# Patient Record
Sex: FEMALE | Race: WHITE | NOT HISPANIC OR LATINO | Employment: FULL TIME | URBAN - METROPOLITAN AREA
[De-identification: names, ages, dates, MRNs, and addresses within clinical notes are randomized per-mention and may not be internally consistent; named-entity substitution may affect disease eponyms.]

---

## 2023-05-24 LAB
RBC, URINE: 1 /HPF (ref 0–5)
WBC, URINE: <1 /HPF (ref 0–5)

## 2023-05-26 LAB — URINE CULTURE: NORMAL

## 2023-06-21 LAB
ERYTHROCYTE DISTRIBUTION WIDTH (RATIO) BY AUTOMATED COUNT: 13.2 % (ref 11.5–14.5)
ERYTHROCYTE MEAN CORPUSCULAR HEMOGLOBIN CONCENTRATION (G/DL) BY AUTOMATED: 32.1 G/DL (ref 32–36)
ERYTHROCYTE MEAN CORPUSCULAR VOLUME (FL) BY AUTOMATED COUNT: 95 FL (ref 80–100)
ERYTHROCYTES (10*6/UL) IN BLOOD BY AUTOMATED COUNT: 4.37 X10E12/L (ref 4–5.2)
HEMATOCRIT (%) IN BLOOD BY AUTOMATED COUNT: 41.4 % (ref 36–46)
HEMOGLOBIN (G/DL) IN BLOOD: 13.3 G/DL (ref 12–16)
LEUKOCYTES (10*3/UL) IN BLOOD BY AUTOMATED COUNT: 6.9 X10E9/L (ref 4.4–11.3)
NRBC (PER 100 WBCS) BY AUTOMATED COUNT: 0 /100 WBC (ref 0–0)
PLATELETS (10*3/UL) IN BLOOD AUTOMATED COUNT: 276 X10E9/L (ref 150–450)

## 2023-06-27 ENCOUNTER — HOSPITAL ENCOUNTER (OUTPATIENT)
Dept: DATA CONVERSION | Facility: HOSPITAL | Age: 68
End: 2023-06-27
Attending: OBSTETRICS & GYNECOLOGY | Admitting: OBSTETRICS & GYNECOLOGY

## 2023-06-27 DIAGNOSIS — N95.0 POSTMENOPAUSAL BLEEDING: ICD-10-CM

## 2023-06-27 DIAGNOSIS — I10 ESSENTIAL (PRIMARY) HYPERTENSION: ICD-10-CM

## 2023-06-27 DIAGNOSIS — N84.0 POLYP OF CORPUS UTERI: ICD-10-CM

## 2023-07-06 LAB
COMPLETE PATHOLOGY REPORT: NORMAL
CONVERTED CLINICAL DIAGNOSIS-HISTORY: NORMAL
CONVERTED FINAL DIAGNOSIS: NORMAL
CONVERTED FINAL REPORT PDF LINK TO COPY AND PASTE: NORMAL
CONVERTED GROSS DESCRIPTION: NORMAL

## 2023-09-29 VITALS — BODY MASS INDEX: 21.79 KG/M2 | WEIGHT: 111.55 LBS

## 2023-09-30 NOTE — H&P
History & Physical Reviewed:   I have reviewed the History and Physical dated:  21-Jun-2023   History and Physical reviewed and relevant findings noted. Patient examined to review pertinent physical  findings.: No significant changes   Home Medications Reviewed: no changes noted   Allergies Reviewed: no changes noted       ERAS (Enhanced Recovery After Surgery):  ·  ERAS Patient: no     Consent:   COVID-19 Consent:  ·  COVID-19 Risk Consent Surgeon has reviewed key risks related to the risk of lianne COVID-19 and if they contract COVID-19 what the risks are.       Electronic Signatures:  Lorie Coffey)  (Signed 27-Jun-2023 09:15)   Authored: History & Physical Reviewed, ERAS, Consent,  Note Completion      Last Updated: 27-Jun-2023 09:15 by Lorie Coffey)

## 2023-10-02 NOTE — OP NOTE
Post Operative Note:     PreOp Diagnosis: PMB   Post-Procedure Diagnosis: endometrial polyp   Procedure: 1.  hysteroscopy   2.  D&C  3.  polypectomy   4.   5.   Surgeon: Dr. Coffey   Resident/Fellow/Other Assistant: n/a   Anesthesia: LMA   Estimated Blood Loss (mL): none   Specimen: yes. EMC, endometrial polyp   Findings: normal size uterine cavity with small endometrial  polyp and small fibroid     Operative Report Dictated:  Dictation: not applicable - note contains Operative  Report   Operative Report:    Patient was taken to the operating room where general anesthesia was found to be adequate.  She was prepped and draped in the usual sterile fashion in dorsal lithotomy  position.  Pelvic exam under anesthesia found normal retroverted uterus.  The patient's bladder was drained via straight cath with findings of clear yellow urine.  A speculum was placed in the vagina and the anterior lip of the cervix was grasped with  a single tooth tenaculum.  The cervix was easily dilated and the uterus sounded to 7 cm.  The Aveta hysteroscope was advanced using normal saline as an insufflator.  The uterine cavity was a normal size.  A broad endometrial polyp was noted along the  right side of the uterus.  A small < 1 cm suspected fibroid was noted at the fundus with scarring from the anterior to posterior wall.  Both tubal ostia were directly visualized.  The resection device was advanced and the polyp and fibroid were both resected  in full.  The hysteroscope was then removed and a sharp curettage was performed until a gritty texture was noted.  Endometrial curettings  and polyp/fibroid specimen were sent to pathology.  All instruments were then removed from the vagina and good hemostasis  was noted.  The patient tolerated the procedure well and was taken to recovery in stable condition.     Attestation:   Note Completion:  Attending Attestation I performed the procedure without a resident         Electronic  Signatures:  Lorie Coffey)  (Signed 27-Jun-2023 11:34)   Authored: Post Operative Note, Note Completion      Last Updated: 27-Jun-2023 11:34 by Lorie Coffey)

## 2023-12-13 ENCOUNTER — TELEPHONE (OUTPATIENT)
Dept: OBSTETRICS AND GYNECOLOGY | Facility: CLINIC | Age: 68
End: 2023-12-13

## 2024-01-04 ENCOUNTER — ANCILLARY PROCEDURE (OUTPATIENT)
Dept: RADIOLOGY | Facility: CLINIC | Age: 69
End: 2024-01-04
Payer: MEDICARE

## 2024-01-04 VITALS — BODY MASS INDEX: 21.6 KG/M2 | WEIGHT: 110.01 LBS | HEIGHT: 60 IN

## 2024-01-04 DIAGNOSIS — Z12.31 ENCOUNTER FOR SCREENING MAMMOGRAM FOR MALIGNANT NEOPLASM OF BREAST: ICD-10-CM

## 2024-01-04 PROCEDURE — 77067 SCR MAMMO BI INCL CAD: CPT | Performed by: STUDENT IN AN ORGANIZED HEALTH CARE EDUCATION/TRAINING PROGRAM

## 2024-01-04 PROCEDURE — 77067 SCR MAMMO BI INCL CAD: CPT

## 2024-01-04 PROCEDURE — 77063 BREAST TOMOSYNTHESIS BI: CPT | Performed by: STUDENT IN AN ORGANIZED HEALTH CARE EDUCATION/TRAINING PROGRAM

## 2024-02-26 ENCOUNTER — APPOINTMENT (OUTPATIENT)
Dept: RADIOLOGY | Facility: HOSPITAL | Age: 69
DRG: 329 | End: 2024-02-26
Payer: MEDICARE

## 2024-02-26 ENCOUNTER — ANESTHESIA EVENT (OUTPATIENT)
Dept: OPERATING ROOM | Facility: HOSPITAL | Age: 69
DRG: 329 | End: 2024-02-26
Payer: MEDICARE

## 2024-02-26 ENCOUNTER — ANESTHESIA (OUTPATIENT)
Dept: OPERATING ROOM | Facility: HOSPITAL | Age: 69
DRG: 329 | End: 2024-02-26
Payer: MEDICARE

## 2024-02-26 ENCOUNTER — HOSPITAL ENCOUNTER (INPATIENT)
Facility: HOSPITAL | Age: 69
LOS: 4 days | Discharge: HOME | DRG: 329 | End: 2024-03-01
Attending: EMERGENCY MEDICINE | Admitting: COLON & RECTAL SURGERY
Payer: MEDICARE

## 2024-02-26 DIAGNOSIS — Z90.49 S/P SMALL BOWEL RESECTION: ICD-10-CM

## 2024-02-26 DIAGNOSIS — K40.30 INGUINAL HERNIA OF LEFT SIDE WITH OBSTRUCTION: ICD-10-CM

## 2024-02-26 DIAGNOSIS — K66.8 FREE INTRAPERITONEAL AIR: ICD-10-CM

## 2024-02-26 DIAGNOSIS — K46.0 INCARCERATED HERNIA: Primary | ICD-10-CM

## 2024-02-26 PROBLEM — R11.2 PONV (POSTOPERATIVE NAUSEA AND VOMITING): Status: ACTIVE | Noted: 2024-02-26

## 2024-02-26 PROBLEM — Z98.890 PONV (POSTOPERATIVE NAUSEA AND VOMITING): Status: ACTIVE | Noted: 2024-02-26

## 2024-02-26 LAB
ABO GROUP (TYPE) IN BLOOD: NORMAL
ALBUMIN SERPL BCP-MCNC: 4.1 G/DL (ref 3.4–5)
ALP SERPL-CCNC: 37 U/L (ref 33–136)
ALT SERPL W P-5'-P-CCNC: 16 U/L (ref 7–45)
ANION GAP SERPL CALC-SCNC: 14 MMOL/L (ref 10–20)
ANTIBODY SCREEN: NORMAL
APTT PPP: 27 SECONDS (ref 27–38)
AST SERPL W P-5'-P-CCNC: 20 U/L (ref 9–39)
BASOPHILS # BLD AUTO: 0.04 X10*3/UL (ref 0–0.1)
BASOPHILS NFR BLD AUTO: 0.4 %
BILIRUB SERPL-MCNC: 1 MG/DL (ref 0–1.2)
BUN SERPL-MCNC: 12 MG/DL (ref 6–23)
CALCIUM SERPL-MCNC: 9 MG/DL (ref 8.6–10.3)
CHLORIDE SERPL-SCNC: 99 MMOL/L (ref 98–107)
CO2 SERPL-SCNC: 28 MMOL/L (ref 21–32)
CREAT SERPL-MCNC: 0.54 MG/DL (ref 0.5–1.05)
EGFRCR SERPLBLD CKD-EPI 2021: >90 ML/MIN/1.73M*2
EOSINOPHIL # BLD AUTO: 0.02 X10*3/UL (ref 0–0.7)
EOSINOPHIL NFR BLD AUTO: 0.2 %
ERYTHROCYTE [DISTWIDTH] IN BLOOD BY AUTOMATED COUNT: 13.1 % (ref 11.5–14.5)
GLUCOSE SERPL-MCNC: 130 MG/DL (ref 74–99)
HCT VFR BLD AUTO: 42.4 % (ref 36–46)
HGB BLD-MCNC: 13.8 G/DL (ref 12–16)
HOLD SPECIMEN: NORMAL
IMM GRANULOCYTES # BLD AUTO: 0.03 X10*3/UL (ref 0–0.7)
IMM GRANULOCYTES NFR BLD AUTO: 0.3 % (ref 0–0.9)
INR PPP: 1.1 (ref 0.9–1.1)
LYMPHOCYTES # BLD AUTO: 0.75 X10*3/UL (ref 1.2–4.8)
LYMPHOCYTES NFR BLD AUTO: 8.3 %
MCH RBC QN AUTO: 30.1 PG (ref 26–34)
MCHC RBC AUTO-ENTMCNC: 32.5 G/DL (ref 32–36)
MCV RBC AUTO: 93 FL (ref 80–100)
MONOCYTES # BLD AUTO: 0.53 X10*3/UL (ref 0.1–1)
MONOCYTES NFR BLD AUTO: 5.9 %
NEUTROPHILS # BLD AUTO: 7.66 X10*3/UL (ref 1.2–7.7)
NEUTROPHILS NFR BLD AUTO: 84.9 %
NRBC BLD-RTO: 0 /100 WBCS (ref 0–0)
PLATELET # BLD AUTO: 306 X10*3/UL (ref 150–450)
POTASSIUM SERPL-SCNC: 4 MMOL/L (ref 3.5–5.3)
PROT SERPL-MCNC: 6.7 G/DL (ref 6.4–8.2)
PROTHROMBIN TIME: 12.1 SECONDS (ref 9.8–12.8)
RBC # BLD AUTO: 4.58 X10*6/UL (ref 4–5.2)
RH FACTOR (ANTIGEN D): NORMAL
SODIUM SERPL-SCNC: 137 MMOL/L (ref 136–145)
WBC # BLD AUTO: 9 X10*3/UL (ref 4.4–11.3)

## 2024-02-26 PROCEDURE — 86900 BLOOD TYPING SEROLOGIC ABO: CPT | Mod: 91 | Performed by: COLON & RECTAL SURGERY

## 2024-02-26 PROCEDURE — 36415 COLL VENOUS BLD VENIPUNCTURE: CPT | Performed by: EMERGENCY MEDICINE

## 2024-02-26 PROCEDURE — 96375 TX/PRO/DX INJ NEW DRUG ADDON: CPT | Mod: 59

## 2024-02-26 PROCEDURE — 2550000001 HC RX 255 CONTRASTS: Performed by: EMERGENCY MEDICINE

## 2024-02-26 PROCEDURE — 3700000002 HC GENERAL ANESTHESIA TIME - EACH INCREMENTAL 1 MINUTE: Performed by: COLON & RECTAL SURGERY

## 2024-02-26 PROCEDURE — 7100000001 HC RECOVERY ROOM TIME - INITIAL BASE CHARGE: Performed by: COLON & RECTAL SURGERY

## 2024-02-26 PROCEDURE — 7100000002 HC RECOVERY ROOM TIME - EACH INCREMENTAL 1 MINUTE: Performed by: COLON & RECTAL SURGERY

## 2024-02-26 PROCEDURE — 99152 MOD SED SAME PHYS/QHP 5/>YRS: CPT | Performed by: EMERGENCY MEDICINE

## 2024-02-26 PROCEDURE — 2500000004 HC RX 250 GENERAL PHARMACY W/ HCPCS (ALT 636 FOR OP/ED): Performed by: ANESTHESIOLOGIST ASSISTANT

## 2024-02-26 PROCEDURE — 2500000005 HC RX 250 GENERAL PHARMACY W/O HCPCS: Performed by: COLON & RECTAL SURGERY

## 2024-02-26 PROCEDURE — A44202 PR LAP,SURG,ENTERECTOMY,RESECT AND ANAST: Performed by: ANESTHESIOLOGIST ASSISTANT

## 2024-02-26 PROCEDURE — 71045 X-RAY EXAM CHEST 1 VIEW: CPT | Performed by: STUDENT IN AN ORGANIZED HEALTH CARE EDUCATION/TRAINING PROGRAM

## 2024-02-26 PROCEDURE — 96376 TX/PRO/DX INJ SAME DRUG ADON: CPT | Mod: 59

## 2024-02-26 PROCEDURE — 99140 ANES COMP EMERGENCY COND: CPT | Performed by: ANESTHESIOLOGY

## 2024-02-26 PROCEDURE — 2500000005 HC RX 250 GENERAL PHARMACY W/O HCPCS: Performed by: EMERGENCY MEDICINE

## 2024-02-26 PROCEDURE — 44202 LAP ENTERECTOMY: CPT | Performed by: COLON & RECTAL SURGERY

## 2024-02-26 PROCEDURE — 96374 THER/PROPH/DIAG INJ IV PUSH: CPT | Mod: 59

## 2024-02-26 PROCEDURE — 71045 X-RAY EXAM CHEST 1 VIEW: CPT

## 2024-02-26 PROCEDURE — 85610 PROTHROMBIN TIME: CPT | Performed by: EMERGENCY MEDICINE

## 2024-02-26 PROCEDURE — 96361 HYDRATE IV INFUSION ADD-ON: CPT

## 2024-02-26 PROCEDURE — 3600000004 HC OR TIME - INITIAL BASE CHARGE - PROCEDURE LEVEL FOUR: Performed by: COLON & RECTAL SURGERY

## 2024-02-26 PROCEDURE — 80053 COMPREHEN METABOLIC PANEL: CPT | Performed by: EMERGENCY MEDICINE

## 2024-02-26 PROCEDURE — 2500000004 HC RX 250 GENERAL PHARMACY W/ HCPCS (ALT 636 FOR OP/ED): Performed by: EMERGENCY MEDICINE

## 2024-02-26 PROCEDURE — 88307 TISSUE EXAM BY PATHOLOGIST: CPT | Performed by: STUDENT IN AN ORGANIZED HEALTH CARE EDUCATION/TRAINING PROGRAM

## 2024-02-26 PROCEDURE — 74177 CT ABD & PELVIS W/CONTRAST: CPT

## 2024-02-26 PROCEDURE — 2500000005 HC RX 250 GENERAL PHARMACY W/O HCPCS: Performed by: ANESTHESIOLOGIST ASSISTANT

## 2024-02-26 PROCEDURE — 3600000009 HC OR TIME - EACH INCREMENTAL 1 MINUTE - PROCEDURE LEVEL FOUR: Performed by: COLON & RECTAL SURGERY

## 2024-02-26 PROCEDURE — 0YQ64ZZ REPAIR LEFT INGUINAL REGION, PERCUTANEOUS ENDOSCOPIC APPROACH: ICD-10-PCS | Performed by: COLON & RECTAL SURGERY

## 2024-02-26 PROCEDURE — 85025 COMPLETE CBC W/AUTO DIFF WBC: CPT | Performed by: EMERGENCY MEDICINE

## 2024-02-26 PROCEDURE — 99222 1ST HOSP IP/OBS MODERATE 55: CPT | Performed by: COLON & RECTAL SURGERY

## 2024-02-26 PROCEDURE — 0DB84ZZ EXCISION OF SMALL INTESTINE, PERCUTANEOUS ENDOSCOPIC APPROACH: ICD-10-PCS | Performed by: COLON & RECTAL SURGERY

## 2024-02-26 PROCEDURE — 85730 THROMBOPLASTIN TIME PARTIAL: CPT | Performed by: EMERGENCY MEDICINE

## 2024-02-26 PROCEDURE — 88307 TISSUE EXAM BY PATHOLOGIST: CPT | Mod: TC,AHULAB | Performed by: COLON & RECTAL SURGERY

## 2024-02-26 PROCEDURE — 74177 CT ABD & PELVIS W/CONTRAST: CPT | Performed by: RADIOLOGY

## 2024-02-26 PROCEDURE — 2500000004 HC RX 250 GENERAL PHARMACY W/ HCPCS (ALT 636 FOR OP/ED): Performed by: COLON & RECTAL SURGERY

## 2024-02-26 PROCEDURE — 99285 EMERGENCY DEPT VISIT HI MDM: CPT | Mod: 25

## 2024-02-26 PROCEDURE — 3700000001 HC GENERAL ANESTHESIA TIME - INITIAL BASE CHARGE: Performed by: COLON & RECTAL SURGERY

## 2024-02-26 PROCEDURE — 2720000007 HC OR 272 NO HCPCS: Performed by: COLON & RECTAL SURGERY

## 2024-02-26 PROCEDURE — A44202 PR LAP,SURG,ENTERECTOMY,RESECT AND ANAST: Performed by: ANESTHESIOLOGY

## 2024-02-26 PROCEDURE — 1100000001 HC PRIVATE ROOM DAILY

## 2024-02-26 RX ORDER — OXYCODONE HYDROCHLORIDE 5 MG/1
5 TABLET ORAL EVERY 4 HOURS PRN
Status: DISCONTINUED | OUTPATIENT
Start: 2024-02-26 | End: 2024-02-26 | Stop reason: HOSPADM

## 2024-02-26 RX ORDER — FENTANYL CITRATE 50 UG/ML
INJECTION, SOLUTION INTRAMUSCULAR; INTRAVENOUS AS NEEDED
Status: DISCONTINUED | OUTPATIENT
Start: 2024-02-26 | End: 2024-02-26

## 2024-02-26 RX ORDER — SODIUM CHLORIDE, SODIUM LACTATE, POTASSIUM CHLORIDE, CALCIUM CHLORIDE 600; 310; 30; 20 MG/100ML; MG/100ML; MG/100ML; MG/100ML
100 INJECTION, SOLUTION INTRAVENOUS CONTINUOUS
Status: DISCONTINUED | OUTPATIENT
Start: 2024-02-26 | End: 2024-02-26 | Stop reason: HOSPADM

## 2024-02-26 RX ORDER — SODIUM CHLORIDE, SODIUM LACTATE, POTASSIUM CHLORIDE, CALCIUM CHLORIDE 600; 310; 30; 20 MG/100ML; MG/100ML; MG/100ML; MG/100ML
INJECTION, SOLUTION INTRAVENOUS CONTINUOUS PRN
Status: DISCONTINUED | OUTPATIENT
Start: 2024-02-26 | End: 2024-02-26

## 2024-02-26 RX ORDER — MIDAZOLAM HYDROCHLORIDE 1 MG/ML
INJECTION, SOLUTION INTRAMUSCULAR; INTRAVENOUS AS NEEDED
Status: DISCONTINUED | OUTPATIENT
Start: 2024-02-26 | End: 2024-02-26

## 2024-02-26 RX ORDER — DEXAMETHASONE SODIUM PHOSPHATE 4 MG/ML
INJECTION, SOLUTION INTRA-ARTICULAR; INTRALESIONAL; INTRAMUSCULAR; INTRAVENOUS; SOFT TISSUE AS NEEDED
Status: DISCONTINUED | OUTPATIENT
Start: 2024-02-26 | End: 2024-02-26

## 2024-02-26 RX ORDER — PROPOFOL 10 MG/ML
INJECTION, EMULSION INTRAVENOUS CONTINUOUS PRN
Status: DISCONTINUED | OUTPATIENT
Start: 2024-02-26 | End: 2024-02-26

## 2024-02-26 RX ORDER — PROPOFOL 10 MG/ML
INJECTION, EMULSION INTRAVENOUS CODE/TRAUMA/SEDATION MEDICATION
Status: COMPLETED | OUTPATIENT
Start: 2024-02-26 | End: 2024-02-26

## 2024-02-26 RX ORDER — ONDANSETRON HYDROCHLORIDE 2 MG/ML
INJECTION, SOLUTION INTRAVENOUS AS NEEDED
Status: DISCONTINUED | OUTPATIENT
Start: 2024-02-26 | End: 2024-02-26

## 2024-02-26 RX ORDER — PROPOFOL 10 MG/ML
1 INJECTION, EMULSION INTRAVENOUS ONCE
Status: DISCONTINUED | OUTPATIENT
Start: 2024-02-26 | End: 2024-02-27

## 2024-02-26 RX ORDER — ALBUTEROL SULFATE 0.83 MG/ML
2.5 SOLUTION RESPIRATORY (INHALATION) ONCE AS NEEDED
Status: DISCONTINUED | OUTPATIENT
Start: 2024-02-26 | End: 2024-02-26 | Stop reason: HOSPADM

## 2024-02-26 RX ORDER — MORPHINE SULFATE 4 MG/ML
4 INJECTION, SOLUTION INTRAMUSCULAR; INTRAVENOUS ONCE
Status: COMPLETED | OUTPATIENT
Start: 2024-02-26 | End: 2024-02-26

## 2024-02-26 RX ORDER — PROPOFOL 10 MG/ML
0.5 INJECTION, EMULSION INTRAVENOUS AS NEEDED
Status: DISCONTINUED | OUTPATIENT
Start: 2024-02-26 | End: 2024-02-27

## 2024-02-26 RX ORDER — PHENYLEPHRINE HCL IN 0.9% NACL 1 MG/10 ML
SYRINGE (ML) INTRAVENOUS AS NEEDED
Status: DISCONTINUED | OUTPATIENT
Start: 2024-02-26 | End: 2024-02-26

## 2024-02-26 RX ORDER — LIDOCAINE HYDROCHLORIDE 20 MG/ML
INJECTION, SOLUTION INFILTRATION; PERINEURAL AS NEEDED
Status: DISCONTINUED | OUTPATIENT
Start: 2024-02-26 | End: 2024-02-26

## 2024-02-26 RX ORDER — ONDANSETRON HYDROCHLORIDE 2 MG/ML
4 INJECTION, SOLUTION INTRAVENOUS ONCE
Status: COMPLETED | OUTPATIENT
Start: 2024-02-26 | End: 2024-02-26

## 2024-02-26 RX ORDER — PROPOFOL 10 MG/ML
INJECTION, EMULSION INTRAVENOUS AS NEEDED
Status: DISCONTINUED | OUTPATIENT
Start: 2024-02-26 | End: 2024-02-26

## 2024-02-26 RX ORDER — HEPARIN SODIUM 5000 [USP'U]/ML
INJECTION, SOLUTION INTRAVENOUS; SUBCUTANEOUS AS NEEDED
Status: DISCONTINUED | OUTPATIENT
Start: 2024-02-26 | End: 2024-02-26

## 2024-02-26 RX ORDER — ONDANSETRON HYDROCHLORIDE 2 MG/ML
4 INJECTION, SOLUTION INTRAVENOUS ONCE AS NEEDED
Status: DISCONTINUED | OUTPATIENT
Start: 2024-02-26 | End: 2024-02-26 | Stop reason: HOSPADM

## 2024-02-26 RX ORDER — ACETAMINOPHEN 325 MG/1
650 TABLET ORAL EVERY 4 HOURS PRN
Status: DISCONTINUED | OUTPATIENT
Start: 2024-02-26 | End: 2024-02-26 | Stop reason: HOSPADM

## 2024-02-26 RX ADMIN — HYDROMORPHONE HYDROCHLORIDE 0.5 MG: 1 INJECTION, SOLUTION INTRAMUSCULAR; INTRAVENOUS; SUBCUTANEOUS at 23:41

## 2024-02-26 RX ADMIN — SODIUM CHLORIDE, POTASSIUM CHLORIDE, SODIUM LACTATE AND CALCIUM CHLORIDE: 600; 310; 30; 20 INJECTION, SOLUTION INTRAVENOUS at 22:12

## 2024-02-26 RX ADMIN — Medication 200 MCG: at 22:44

## 2024-02-26 RX ADMIN — PROPOFOL 30 MG: 10 INJECTION, EMULSION INTRAVENOUS at 23:24

## 2024-02-26 RX ADMIN — LIDOCAINE HYDROCHLORIDE 60 MG: 20 INJECTION, SOLUTION INFILTRATION; PERINEURAL at 22:20

## 2024-02-26 RX ADMIN — ONDANSETRON 4 MG: 2 INJECTION INTRAMUSCULAR; INTRAVENOUS at 23:22

## 2024-02-26 RX ADMIN — IOHEXOL 75 ML: 350 INJECTION, SOLUTION INTRAVENOUS at 19:47

## 2024-02-26 RX ADMIN — PROPOFOL 20 MG: 10 INJECTION, EMULSION INTRAVENOUS at 18:59

## 2024-02-26 RX ADMIN — HEPARIN SODIUM 5000 UNITS: 5000 INJECTION INTRAVENOUS; SUBCUTANEOUS at 22:18

## 2024-02-26 RX ADMIN — Medication: at 17:45

## 2024-02-26 RX ADMIN — PROPOFOL 10 MG: 10 INJECTION, EMULSION INTRAVENOUS at 19:03

## 2024-02-26 RX ADMIN — SODIUM CHLORIDE, SODIUM LACTATE, POTASSIUM CHLORIDE, CALCIUM CHLORIDE: 600; 310; 30; 20 INJECTION, SOLUTION INTRAVENOUS at 22:24

## 2024-02-26 RX ADMIN — MIDAZOLAM HYDROCHLORIDE 2 MG: 1 INJECTION, SOLUTION INTRAMUSCULAR; INTRAVENOUS at 22:12

## 2024-02-26 RX ADMIN — PROPOFOL 200 MCG/KG/MIN: 10 INJECTION, EMULSION INTRAVENOUS at 22:21

## 2024-02-26 RX ADMIN — MORPHINE SULFATE 4 MG: 4 INJECTION, SOLUTION INTRAMUSCULAR; INTRAVENOUS at 21:01

## 2024-02-26 RX ADMIN — PROPOFOL 70 MG: 10 INJECTION, EMULSION INTRAVENOUS at 23:12

## 2024-02-26 RX ADMIN — PROPOFOL 20 MG: 10 INJECTION, EMULSION INTRAVENOUS at 18:58

## 2024-02-26 RX ADMIN — PROPOFOL 130 MG: 10 INJECTION, EMULSION INTRAVENOUS at 22:20

## 2024-02-26 RX ADMIN — PIPERACILLIN SODIUM AND TAZOBACTAM SODIUM 4.5 G: 4; .5 INJECTION, SOLUTION INTRAVENOUS at 21:06

## 2024-02-26 RX ADMIN — PROPOFOL 20 MG: 10 INJECTION, EMULSION INTRAVENOUS at 19:01

## 2024-02-26 RX ADMIN — HYDROMORPHONE HYDROCHLORIDE 0.5 MG: 1 INJECTION, SOLUTION INTRAMUSCULAR; INTRAVENOUS; SUBCUTANEOUS at 23:54

## 2024-02-26 RX ADMIN — SODIUM CHLORIDE 1000 ML: 9 INJECTION, SOLUTION INTRAVENOUS at 18:01

## 2024-02-26 RX ADMIN — FENTANYL CITRATE 50 MCG: 50 INJECTION, SOLUTION INTRAMUSCULAR; INTRAVENOUS at 23:34

## 2024-02-26 RX ADMIN — Medication 200 MCG: at 22:32

## 2024-02-26 RX ADMIN — Medication 0.25 MCG/KG/MIN: at 22:21

## 2024-02-26 RX ADMIN — ONDANSETRON 4 MG: 2 INJECTION INTRAMUSCULAR; INTRAVENOUS at 19:24

## 2024-02-26 RX ADMIN — PROPOFOL 20 MG: 10 INJECTION, EMULSION INTRAVENOUS at 19:05

## 2024-02-26 RX ADMIN — DEXAMETHASONE SODIUM PHOSPHATE 4 MG: 4 INJECTION, SOLUTION INTRAMUSCULAR; INTRAVENOUS at 23:22

## 2024-02-26 RX ADMIN — MORPHINE SULFATE 4 MG: 4 INJECTION, SOLUTION INTRAMUSCULAR; INTRAVENOUS at 17:10

## 2024-02-26 RX ADMIN — PROPOFOL 10 MG: 10 INJECTION, EMULSION INTRAVENOUS at 19:00

## 2024-02-26 RX ADMIN — PROPOFOL 10 MG: 10 INJECTION, EMULSION INTRAVENOUS at 19:01

## 2024-02-26 RX ADMIN — MORPHINE SULFATE 4 MG: 4 INJECTION, SOLUTION INTRAMUSCULAR; INTRAVENOUS at 19:24

## 2024-02-26 RX ADMIN — FENTANYL CITRATE 50 MCG: 50 INJECTION, SOLUTION INTRAMUSCULAR; INTRAVENOUS at 22:20

## 2024-02-26 RX ADMIN — PROPOFOL 20 MG: 10 INJECTION, EMULSION INTRAVENOUS at 19:04

## 2024-02-26 ASSESSMENT — PAIN - FUNCTIONAL ASSESSMENT
PAIN_FUNCTIONAL_ASSESSMENT: 0-10

## 2024-02-26 ASSESSMENT — PAIN SCALES - GENERAL
PAINLEVEL_OUTOF10: 5 - MODERATE PAIN
PAINLEVEL_OUTOF10: 8
PAINLEVEL_OUTOF10: 8
PAINLEVEL_OUTOF10: 9
PAINLEVEL_OUTOF10: 10 - WORST POSSIBLE PAIN
PAINLEVEL_OUTOF10: 8

## 2024-02-26 ASSESSMENT — PAIN DESCRIPTION - LOCATION: LOCATION: GROIN

## 2024-02-26 ASSESSMENT — COLUMBIA-SUICIDE SEVERITY RATING SCALE - C-SSRS
2. HAVE YOU ACTUALLY HAD ANY THOUGHTS OF KILLING YOURSELF?: NO
1. IN THE PAST MONTH, HAVE YOU WISHED YOU WERE DEAD OR WISHED YOU COULD GO TO SLEEP AND NOT WAKE UP?: NO
6. HAVE YOU EVER DONE ANYTHING, STARTED TO DO ANYTHING, OR PREPARED TO DO ANYTHING TO END YOUR LIFE?: NO

## 2024-02-26 ASSESSMENT — PAIN DESCRIPTION - ORIENTATION: ORIENTATION: LEFT

## 2024-02-26 NOTE — DISCHARGE INSTRUCTIONS
You have been sent home a prescription for Iron based on your lab work after surgery would take for the next month. You have also been sent with a prescription for Ibuprofen and Oxycodone. Please purchase Miralax or Colace over the counter and take 1-2 times per day. You may also take Tylenol as needed for pain.     Instructions After Laparoscopic Surgery  Wound Care:  You have a portion of your incision that is now open to improve the redness around your incision and prevent infection   - please keep wound clean and dry throughout the day  - please shower daily, remove guaze packing prior to or during shower If the packing becomes dry and painful to remove  - let the water hit the inside of the incision, you do not need to intentionally place soap in the opening of the wound  - when you get off the shower please pat dry around the outside and replace guaze, okay to cover with abdominal bandage, you do not need tape, you may use underwear to hold into place  - DO NOT SOAK with the open incision   - The hole with gradually get smaller, okay to use less guaze as this happens  -do not apply topical creams or ointments  -call if you notice redness around wound, foul-smelliing drainage, or increasing pain   - plan is to see Dr. Berman in the office in 7-10 days after discharge from the hospital  Diet:          -Avoid greasy, fatty foods first few weeks          -[place], soft meals first day or two after surgery  Activity          -Take it easy for the first 48 hours          -stairs and walks are fine          -resume activities gradually over the first week          -ask Dr Berman before resuming strenuous physical exertions          -No driving while on pain medication  Medications          -Pain medicine prescription attached for severe pain          -You can also take Motrin/Ibuprofen 600mg every 6 hours for mild pain          -Resume your home medications unless otherwise directed          -To avoid constipation please  take Colace 100mg twice a day (over the counter)  Other Instructions          -Call to make appointment within 1-2 days: [phone]          -Call the doctor for the following:   severe unrelieved pain   fevers > 101F   Nausea/vomiting   wound issues   insurance/return to work forms   shortness of breath   chest pains

## 2024-02-26 NOTE — ED PROVIDER NOTES
HPI   Chief Complaint   Patient presents with    Hernia       Chief complaint: Hernia    History of present illness: Patient is a 68-year-old female with history of a hernia of her left lower quadrant presenting to the emergency department with complaints of pain.  According to the patient, she has a history of a hernia in her left groin.  Patient states that starting today, she began to experience pain in the area where the hernia is located this radiates throughout her abdomen.  Patient denies ever having pain like this in the past she admits to nausea with this without vomiting.  Patient denies any fever with this.  Concern the pain is intolerable, the patient presents to the emergency department for further evaluation.  Patient has no other complaints at this time.        History provided by:  Patient   used: No                        Nayeli Coma Scale Score: 15                     Patient History   History reviewed. No pertinent past medical history.  History reviewed. No pertinent surgical history.  No family history on file.  Social History     Tobacco Use    Smoking status: Not on file    Smokeless tobacco: Not on file   Substance Use Topics    Alcohol use: Not on file    Drug use: Not on file       Physical Exam   ED Triage Vitals   Temperature Heart Rate Respirations BP   02/26/24 1309 02/26/24 1309 02/26/24 1309 02/26/24 1310   36.7 °C (98.1 °F) 73 16 106/56      Pulse Ox Temp Source Heart Rate Source Patient Position   02/26/24 1309 02/26/24 1309 -- --   98 % Tympanic        BP Location FiO2 (%)     -- --             Physical Exam  Constitutional:       Appearance: Normal appearance.   HENT:      Head: Normocephalic and atraumatic.      Right Ear: External ear normal.      Left Ear: External ear normal.      Nose: Nose normal.      Mouth/Throat:      Mouth: Mucous membranes are moist.   Eyes:      General: Lids are normal.      Extraocular Movements: Extraocular movements intact.       Pupils: Pupils are equal, round, and reactive to light.   Cardiovascular:      Rate and Rhythm: Normal rate and regular rhythm.      Heart sounds: Normal heart sounds.   Pulmonary:      Effort: Pulmonary effort is normal.      Breath sounds: Normal breath sounds.   Abdominal:      General: Abdomen is flat.      Palpations: Abdomen is soft.      Tenderness: There is no abdominal tenderness. There is no guarding or rebound.      Hernia: A hernia is present. Hernia is present in the left inguinal area.   Musculoskeletal:         General: No deformity. Normal range of motion.      Cervical back: Normal range of motion and neck supple.   Skin:     General: Skin is warm.      Capillary Refill: Capillary refill takes less than 2 seconds.      Coloration: Skin is not jaundiced.   Neurological:      General: No focal deficit present.      Mental Status: She is alert and oriented to person, place, and time.   Psychiatric:         Mood and Affect: Mood normal.         Behavior: Behavior normal.         ED Course & MDM   Diagnoses as of 02/27/24 1742   Incarcerated hernia       Medical Decision Making  Medical decision making: Patient remained stable throughout my time with her in the emergency department.  Initially on presentation to the emergency department was apparent that the patient was experiencing a incarcerated hernia of the left lower quadrant.  The patient was placed in Trendelenburg and an ice pack was placed over the hernia.  The patient was given 4 mg of morphine IV after this, I attempted reduction of the hernia with gentle gentle steady pressure however this was unsuccessful.  The patient had too much pain and the hernia was not decreasing in size significantly as a result, I explained the patient would have to go undergo procedural sedation for reduction of this hernia.  The patient expressed understanding.    The patient was placed on monitor.  The patient was given propofol IV until she was sedated.  Once  proper analgesia was obtained, I placed heavy pressure with my hands over the hernia finally, the hernia reduced in size and reduced.  I stayed with the patient until she recovered from the propofol bolus.    To my surprise, the patient was continued to have abdominal discomfort after the reduction and had some rebound tenderness as a result, I decided I would image the patient.  CAT scan of the patient's abdomen and pelvis with IV contrast demonstrated what is likely necrotic bowel with a small amount of free air in the patient's peritoneal cavity.  The patient was ordered Zosyn.  I explained to the patient that she had a perforated bowel and would require surgical consultation.    I contacted the surgeon on-call regarding this patient's case they expressed understanding the patient was evaluated and taken directly to the operating room for definitive management of this radiographic finding.    Amount and/or Complexity of Data Reviewed  Labs: ordered. Decision-making details documented in ED Course.  Radiology: ordered. Decision-making details documented in ED Course.  ECG/medicine tests: ordered and independent interpretation performed. Decision-making details documented in ED Course.        Procedure  Moderate Sedation    Performed by: Toro Bolanos MD  Authorized by: Toro Bolanos MD    Consent:     Consent obtained:  Verbal    Consent given by:  Patient    Risks, benefits, and alternatives were discussed: yes      Risks discussed:  Respiratory compromise necessitating ventilatory assistance and intubation and inadequate sedation    Alternatives discussed:  Analgesia without sedation  Universal protocol:     Procedure explained and questions answered to patient or proxy's satisfaction: yes      Test results available: yes      Required blood products, implants, devices, and special equipment available: yes      Immediately prior to procedure, a time out was called: yes      Patient identity confirmed:   Verbally with patient  Indications:     Sedation is required to allow for: Left inguinal hernia reduction.    Procedure necessitating sedation performed by:  Physician performing sedation    Intended level of sedation:  Moderate  Pre-sedation assessment:     Time since last food or drink:  08:00    ASA classification: class 1 - normal, healthy patient      Neck mobility: normal      Pre-sedation assessments completed and reviewed: airway patency, anesthesia/sedation history, mental status, pain level and respiratory function      History of difficult intubation: no    Immediate pre-procedure details:     Reassessment: Patient reassessed immediately prior to procedure      Reviewed: vital signs, relevant labs/tests and NPO status      Verified: bag valve mask available, emergency equipment available, IV patency confirmed and oxygen available    Procedure details (see MAR for exact dosages):     Preoxygenation:  Nasal cannula    Sedation:  Propofol    Intra-procedure monitoring:  Blood pressure monitoring, cardiac monitor, continuous pulse oximetry, continuous capnometry, frequent LOC assessments and frequent vital sign checks    Intra-procedure events: none      Total sedation time (minutes):  10  Post-procedure details:     Attendance: Constant attendance by certified staff until patient recovered      Recovery: Patient returned to pre-procedure baseline      Estimated blood loss (see I/O flowsheets): no      Post-sedation assessments completed and reviewed: airway patency, cardiovascular function, mental status, nausea/vomiting, pain level and respiratory function      Specimens recovered:  None    Patient is stable for discharge or admission: yes      Procedure completion:  Tolerated       Toro Bolanos MD  02/27/24 0913

## 2024-02-26 NOTE — ED TRIAGE NOTES
PT PRESENTS TO THE ED FOR LEFT SIDE GROIN HERNIA. PT STATES THAT SHE FELT A POP TODAY AND NOTICED IT WAS STICKING OUT. PT ENDORSES THAT SHE WAS TOLD T COME TO THE ED FROM PCP IF THIS WAS TO HAPPEN. PT ENDORSES NAUSEA WITH THIS. PT STATES SHE WAS NOT LIFTING ANYTHING WHEN THIS HAPPENED.

## 2024-02-27 LAB
ANION GAP SERPL CALC-SCNC: 11 MMOL/L (ref 10–20)
BUN SERPL-MCNC: 13 MG/DL (ref 6–23)
CALCIUM SERPL-MCNC: 8.3 MG/DL (ref 8.6–10.3)
CHLORIDE SERPL-SCNC: 103 MMOL/L (ref 98–107)
CO2 SERPL-SCNC: 26 MMOL/L (ref 21–32)
CREAT SERPL-MCNC: 0.54 MG/DL (ref 0.5–1.05)
EGFRCR SERPLBLD CKD-EPI 2021: >90 ML/MIN/1.73M*2
ERYTHROCYTE [DISTWIDTH] IN BLOOD BY AUTOMATED COUNT: 13.2 % (ref 11.5–14.5)
GLUCOSE SERPL-MCNC: 138 MG/DL (ref 74–99)
HCT VFR BLD AUTO: 31 % (ref 36–46)
HGB BLD-MCNC: 10.8 G/DL (ref 12–16)
MCH RBC QN AUTO: 30.7 PG (ref 26–34)
MCHC RBC AUTO-ENTMCNC: 34.8 G/DL (ref 32–36)
MCV RBC AUTO: 88 FL (ref 80–100)
NRBC BLD-RTO: 0 /100 WBCS (ref 0–0)
PLATELET # BLD AUTO: 229 X10*3/UL (ref 150–450)
POTASSIUM SERPL-SCNC: 3.9 MMOL/L (ref 3.5–5.3)
RBC # BLD AUTO: 3.52 X10*6/UL (ref 4–5.2)
SODIUM SERPL-SCNC: 136 MMOL/L (ref 136–145)
WBC # BLD AUTO: 13.4 X10*3/UL (ref 4.4–11.3)

## 2024-02-27 PROCEDURE — 36415 COLL VENOUS BLD VENIPUNCTURE: CPT | Performed by: COLON & RECTAL SURGERY

## 2024-02-27 PROCEDURE — 2500000001 HC RX 250 WO HCPCS SELF ADMINISTERED DRUGS (ALT 637 FOR MEDICARE OP): Performed by: COLON & RECTAL SURGERY

## 2024-02-27 PROCEDURE — 9420000001 HC RT PATIENT EDUCATION 5 MIN

## 2024-02-27 PROCEDURE — 80048 BASIC METABOLIC PNL TOTAL CA: CPT | Performed by: COLON & RECTAL SURGERY

## 2024-02-27 PROCEDURE — 85027 COMPLETE CBC AUTOMATED: CPT | Performed by: COLON & RECTAL SURGERY

## 2024-02-27 PROCEDURE — 1100000001 HC PRIVATE ROOM DAILY

## 2024-02-27 PROCEDURE — 99232 SBSQ HOSP IP/OBS MODERATE 35: CPT

## 2024-02-27 PROCEDURE — 2500000004 HC RX 250 GENERAL PHARMACY W/ HCPCS (ALT 636 FOR OP/ED): Performed by: ANESTHESIOLOGY

## 2024-02-27 PROCEDURE — 2500000001 HC RX 250 WO HCPCS SELF ADMINISTERED DRUGS (ALT 637 FOR MEDICARE OP): Performed by: ANESTHESIOLOGY

## 2024-02-27 PROCEDURE — 2500000004 HC RX 250 GENERAL PHARMACY W/ HCPCS (ALT 636 FOR OP/ED): Performed by: COLON & RECTAL SURGERY

## 2024-02-27 RX ORDER — ESTRADIOL 0.03 MG/D
1 PATCH TRANSDERMAL
Status: CANCELLED | OUTPATIENT
Start: 2024-02-28

## 2024-02-27 RX ORDER — SODIUM CHLORIDE 9 MG/ML
50 INJECTION, SOLUTION INTRAVENOUS CONTINUOUS
Status: DISCONTINUED | OUTPATIENT
Start: 2024-02-27 | End: 2024-02-29

## 2024-02-27 RX ORDER — ESTRADIOL 0.03 MG/D
2 PATCH, EXTENDED RELEASE TRANSDERMAL 2 TIMES WEEKLY
COMMUNITY
End: 2024-04-03 | Stop reason: SDUPTHER

## 2024-02-27 RX ORDER — OXYCODONE HYDROCHLORIDE 5 MG/1
5 TABLET ORAL EVERY 4 HOURS PRN
Status: DISCONTINUED | OUTPATIENT
Start: 2024-02-27 | End: 2024-03-01 | Stop reason: HOSPADM

## 2024-02-27 RX ORDER — ALBUTEROL SULFATE 0.83 MG/ML
2.5 SOLUTION RESPIRATORY (INHALATION) ONCE AS NEEDED
Status: DISCONTINUED | OUTPATIENT
Start: 2024-02-27 | End: 2024-02-27 | Stop reason: HOSPADM

## 2024-02-27 RX ORDER — AMLODIPINE AND BENAZEPRIL HYDROCHLORIDE 10; 20 MG/1; MG/1
1 CAPSULE ORAL
Status: CANCELLED | OUTPATIENT
Start: 2024-02-27

## 2024-02-27 RX ORDER — OXYCODONE HYDROCHLORIDE 5 MG/1
5 TABLET ORAL EVERY 4 HOURS PRN
Status: DISCONTINUED | OUTPATIENT
Start: 2024-02-27 | End: 2024-02-27 | Stop reason: HOSPADM

## 2024-02-27 RX ORDER — OXYCODONE HYDROCHLORIDE 5 MG/1
10 TABLET ORAL EVERY 4 HOURS PRN
Status: DISCONTINUED | OUTPATIENT
Start: 2024-02-27 | End: 2024-03-01 | Stop reason: HOSPADM

## 2024-02-27 RX ORDER — NALOXONE HYDROCHLORIDE 0.4 MG/ML
0.2 INJECTION, SOLUTION INTRAMUSCULAR; INTRAVENOUS; SUBCUTANEOUS EVERY 5 MIN PRN
Status: DISCONTINUED | OUTPATIENT
Start: 2024-02-27 | End: 2024-03-01 | Stop reason: HOSPADM

## 2024-02-27 RX ORDER — ONDANSETRON HYDROCHLORIDE 2 MG/ML
4 INJECTION, SOLUTION INTRAVENOUS EVERY 8 HOURS PRN
Status: DISCONTINUED | OUTPATIENT
Start: 2024-02-27 | End: 2024-03-01 | Stop reason: HOSPADM

## 2024-02-27 RX ORDER — SODIUM CHLORIDE, SODIUM LACTATE, POTASSIUM CHLORIDE, CALCIUM CHLORIDE 600; 310; 30; 20 MG/100ML; MG/100ML; MG/100ML; MG/100ML
100 INJECTION, SOLUTION INTRAVENOUS CONTINUOUS
Status: DISCONTINUED | OUTPATIENT
Start: 2024-02-27 | End: 2024-02-27 | Stop reason: HOSPADM

## 2024-02-27 RX ORDER — ONDANSETRON HYDROCHLORIDE 2 MG/ML
4 INJECTION, SOLUTION INTRAVENOUS ONCE AS NEEDED
Status: DISCONTINUED | OUTPATIENT
Start: 2024-02-27 | End: 2024-02-27 | Stop reason: HOSPADM

## 2024-02-27 RX ORDER — ENOXAPARIN SODIUM 100 MG/ML
40 INJECTION SUBCUTANEOUS EVERY 24 HOURS
Status: DISCONTINUED | OUTPATIENT
Start: 2024-02-27 | End: 2024-03-01 | Stop reason: HOSPADM

## 2024-02-27 RX ORDER — ACETAMINOPHEN 325 MG/1
650 TABLET ORAL EVERY 6 HOURS
Status: DISCONTINUED | OUTPATIENT
Start: 2024-02-27 | End: 2024-03-01 | Stop reason: HOSPADM

## 2024-02-27 RX ORDER — PROGESTERONE 100 MG/1
100 CAPSULE ORAL DAILY
Status: CANCELLED | OUTPATIENT
Start: 2024-02-27

## 2024-02-27 RX ORDER — AMLODIPINE AND BENAZEPRIL HYDROCHLORIDE 5; 10 MG/1; MG/1
1 CAPSULE ORAL
COMMUNITY
Start: 2022-08-11

## 2024-02-27 RX ORDER — ONDANSETRON 4 MG/1
4 TABLET, ORALLY DISINTEGRATING ORAL EVERY 8 HOURS PRN
Status: DISCONTINUED | OUTPATIENT
Start: 2024-02-27 | End: 2024-03-01 | Stop reason: HOSPADM

## 2024-02-27 RX ORDER — ACETAMINOPHEN 325 MG/1
650 TABLET ORAL EVERY 4 HOURS PRN
Status: DISCONTINUED | OUTPATIENT
Start: 2024-02-27 | End: 2024-02-27 | Stop reason: HOSPADM

## 2024-02-27 RX ORDER — KETOROLAC TROMETHAMINE 30 MG/ML
15 INJECTION, SOLUTION INTRAMUSCULAR; INTRAVENOUS EVERY 6 HOURS SCHEDULED
Status: DISPENSED | OUTPATIENT
Start: 2024-02-27 | End: 2024-02-29

## 2024-02-27 RX ORDER — PROGESTERONE 100 MG/1
100 CAPSULE ORAL DAILY
COMMUNITY

## 2024-02-27 RX ORDER — GABAPENTIN 300 MG/1
300 CAPSULE ORAL 3 TIMES DAILY
Status: DISCONTINUED | OUTPATIENT
Start: 2024-02-27 | End: 2024-03-01

## 2024-02-27 RX ORDER — AMLODIPINE BESYLATE 5 MG/1
5 TABLET ORAL DAILY
Status: ON HOLD | COMMUNITY
End: 2024-02-27 | Stop reason: WASHOUT

## 2024-02-27 RX ADMIN — HYDROMORPHONE HYDROCHLORIDE 0.5 MG: 1 INJECTION, SOLUTION INTRAMUSCULAR; INTRAVENOUS; SUBCUTANEOUS at 00:00

## 2024-02-27 RX ADMIN — PIPERACILLIN SODIUM AND TAZOBACTAM SODIUM 3.38 G: 3; .375 INJECTION, SOLUTION INTRAVENOUS at 09:18

## 2024-02-27 RX ADMIN — KETOROLAC TROMETHAMINE 15 MG: 30 INJECTION, SOLUTION INTRAMUSCULAR; INTRAVENOUS at 09:44

## 2024-02-27 RX ADMIN — PIPERACILLIN SODIUM AND TAZOBACTAM SODIUM 3.38 G: 3; .375 INJECTION, SOLUTION INTRAVENOUS at 02:13

## 2024-02-27 RX ADMIN — OXYCODONE HYDROCHLORIDE 5 MG: 5 TABLET ORAL at 00:18

## 2024-02-27 RX ADMIN — HYDROMORPHONE HYDROCHLORIDE 0.5 MG: 1 INJECTION, SOLUTION INTRAMUSCULAR; INTRAVENOUS; SUBCUTANEOUS at 00:07

## 2024-02-27 RX ADMIN — ACETAMINOPHEN 650 MG: 325 TABLET ORAL at 14:29

## 2024-02-27 RX ADMIN — KETOROLAC TROMETHAMINE 15 MG: 30 INJECTION, SOLUTION INTRAMUSCULAR; INTRAVENOUS at 02:14

## 2024-02-27 RX ADMIN — ENOXAPARIN SODIUM 40 MG: 40 INJECTION SUBCUTANEOUS at 02:14

## 2024-02-27 RX ADMIN — GABAPENTIN 300 MG: 300 CAPSULE ORAL at 14:29

## 2024-02-27 RX ADMIN — SODIUM CHLORIDE 100 ML/HR: 9 INJECTION, SOLUTION INTRAVENOUS at 02:14

## 2024-02-27 RX ADMIN — GABAPENTIN 300 MG: 300 CAPSULE ORAL at 21:18

## 2024-02-27 RX ADMIN — ACETAMINOPHEN 650 MG: 325 TABLET ORAL at 21:18

## 2024-02-27 RX ADMIN — GABAPENTIN 300 MG: 300 CAPSULE ORAL at 09:18

## 2024-02-27 RX ADMIN — PIPERACILLIN SODIUM AND TAZOBACTAM SODIUM 3.38 G: 3; .375 INJECTION, SOLUTION INTRAVENOUS at 21:18

## 2024-02-27 RX ADMIN — ACETAMINOPHEN 650 MG: 325 TABLET ORAL at 08:00

## 2024-02-27 RX ADMIN — PIPERACILLIN SODIUM AND TAZOBACTAM SODIUM 3.38 G: 3; .375 INJECTION, SOLUTION INTRAVENOUS at 14:29

## 2024-02-27 SDOH — SOCIAL STABILITY: SOCIAL INSECURITY: ARE THERE ANY APPARENT SIGNS OF INJURIES/BEHAVIORS THAT COULD BE RELATED TO ABUSE/NEGLECT?: NO

## 2024-02-27 SDOH — SOCIAL STABILITY: SOCIAL INSECURITY: DOES ANYONE TRY TO KEEP YOU FROM HAVING/CONTACTING OTHER FRIENDS OR DOING THINGS OUTSIDE YOUR HOME?: NO

## 2024-02-27 SDOH — SOCIAL STABILITY: SOCIAL INSECURITY: DO YOU FEEL UNSAFE GOING BACK TO THE PLACE WHERE YOU ARE LIVING?: NO

## 2024-02-27 SDOH — SOCIAL STABILITY: SOCIAL INSECURITY: WERE YOU ABLE TO COMPLETE ALL THE BEHAVIORAL HEALTH SCREENINGS?: YES

## 2024-02-27 SDOH — SOCIAL STABILITY: SOCIAL INSECURITY: ARE YOU OR HAVE YOU BEEN THREATENED OR ABUSED PHYSICALLY, EMOTIONALLY, OR SEXUALLY BY ANYONE?: NO

## 2024-02-27 SDOH — SOCIAL STABILITY: SOCIAL INSECURITY: HAVE YOU HAD THOUGHTS OF HARMING ANYONE ELSE?: NO

## 2024-02-27 SDOH — SOCIAL STABILITY: SOCIAL INSECURITY: ABUSE: ADULT

## 2024-02-27 SDOH — SOCIAL STABILITY: SOCIAL INSECURITY: HAS ANYONE EVER THREATENED TO HURT YOUR FAMILY OR YOUR PETS?: NO

## 2024-02-27 SDOH — SOCIAL STABILITY: SOCIAL INSECURITY: DO YOU FEEL ANYONE HAS EXPLOITED OR TAKEN ADVANTAGE OF YOU FINANCIALLY OR OF YOUR PERSONAL PROPERTY?: NO

## 2024-02-27 ASSESSMENT — COGNITIVE AND FUNCTIONAL STATUS - GENERAL
CLIMB 3 TO 5 STEPS WITH RAILING: A LITTLE
DAILY ACTIVITIY SCORE: 24
MOBILITY SCORE: 20
WALKING IN HOSPITAL ROOM: A LITTLE
WALKING IN HOSPITAL ROOM: A LITTLE
MOVING TO AND FROM BED TO CHAIR: A LITTLE
DAILY ACTIVITIY SCORE: 24
MOVING TO AND FROM BED TO CHAIR: A LITTLE
PATIENT BASELINE BEDBOUND: NO
STANDING UP FROM CHAIR USING ARMS: A LITTLE
STANDING UP FROM CHAIR USING ARMS: A LITTLE
DAILY ACTIVITIY SCORE: 24
WALKING IN HOSPITAL ROOM: A LITTLE
CLIMB 3 TO 5 STEPS WITH RAILING: A LITTLE
STANDING UP FROM CHAIR USING ARMS: A LITTLE
MOBILITY SCORE: 20
MOBILITY SCORE: 20
CLIMB 3 TO 5 STEPS WITH RAILING: A LITTLE
MOVING TO AND FROM BED TO CHAIR: A LITTLE

## 2024-02-27 ASSESSMENT — ACTIVITIES OF DAILY LIVING (ADL)
TOILETING: INDEPENDENT
HEARING - RIGHT EAR: FUNCTIONAL
LACK_OF_TRANSPORTATION: NO
DRESSING YOURSELF: INDEPENDENT
LACK_OF_TRANSPORTATION: NO
HEARING - LEFT EAR: FUNCTIONAL
JUDGMENT_ADEQUATE_SAFELY_COMPLETE_DAILY_ACTIVITIES: YES
ADEQUATE_TO_COMPLETE_ADL: YES
FEEDING YOURSELF: INDEPENDENT
GROOMING: INDEPENDENT
WALKS IN HOME: NEEDS ASSISTANCE
BATHING: INDEPENDENT
PATIENT'S MEMORY ADEQUATE TO SAFELY COMPLETE DAILY ACTIVITIES?: YES

## 2024-02-27 ASSESSMENT — PAIN SCALES - GENERAL
PAINLEVEL_OUTOF10: 6
PAINLEVEL_OUTOF10: 8
PAINLEVEL_OUTOF10: 2
PAINLEVEL_OUTOF10: 6
PAINLEVEL_OUTOF10: 5 - MODERATE PAIN
PAINLEVEL_OUTOF10: 7
PAINLEVEL_OUTOF10: 8
PAINLEVEL_OUTOF10: 3

## 2024-02-27 ASSESSMENT — PATIENT HEALTH QUESTIONNAIRE - PHQ9
1. LITTLE INTEREST OR PLEASURE IN DOING THINGS: NOT AT ALL
2. FEELING DOWN, DEPRESSED OR HOPELESS: NOT AT ALL
SUM OF ALL RESPONSES TO PHQ9 QUESTIONS 1 & 2: 0

## 2024-02-27 ASSESSMENT — LIFESTYLE VARIABLES
HAVE YOU OR SOMEONE ELSE BEEN INJURED AS A RESULT OF YOUR DRINKING: NO
HOW OFTEN DO YOU HAVE 6 OR MORE DRINKS ON ONE OCCASION: LESS THAN MONTHLY
SKIP TO QUESTIONS 9-10: 0
AUDIT-C TOTAL SCORE: 4
AUDIT TOTAL SCORE: 4
AUDIT-C TOTAL SCORE: 4
HOW OFTEN DURING THE LAST YEAR HAVE YOU HAD A FEELING OF GUILT OR REMORSE AFTER DRINKING: NEVER
HOW MANY STANDARD DRINKS CONTAINING ALCOHOL DO YOU HAVE ON A TYPICAL DAY: 1 OR 2
HOW OFTEN DURING THE LAST YEAR HAVE YOU BEEN UNABLE TO REMEMBER WHAT HAPPENED THE NIGHT BEFORE BECAUSE YOU HAD BEEN DRINKING: NEVER
HAS A RELATIVE, FRIEND, DOCTOR, OR ANOTHER HEALTH PROFESSIONAL EXPRESSED CONCERN ABOUT YOUR DRINKING OR SUGGESTED YOU CUT DOWN: NO
HOW OFTEN DURING THE LAST YEAR HAVE YOU NEEDED AN ALCOHOLIC DRINK FIRST THING IN THE MORNING TO GET YOURSELF GOING AFTER A NIGHT OF HEAVY DRINKING: NEVER
HOW OFTEN DO YOU HAVE A DRINK CONTAINING ALCOHOL: 2-3 TIMES A WEEK
HOW OFTEN DURING THE LAST YEAR HAVE YOU FOUND THAT YOU WERE NOT ABLE TO STOP DRINKING ONCE YOU HAD STARTED: NEVER
HOW OFTEN DURING THE LAST YEAR HAVE YOU FAILED TO DO WHAT WAS NORMALLY EXPECTED FROM YOU BECAUSE OF DRINKING: NEVER
AUDIT TOTAL SCORE: 0

## 2024-02-27 ASSESSMENT — PAIN - FUNCTIONAL ASSESSMENT
PAIN_FUNCTIONAL_ASSESSMENT: 0-10

## 2024-02-27 ASSESSMENT — PAIN DESCRIPTION - ORIENTATION: ORIENTATION: LEFT

## 2024-02-27 ASSESSMENT — PAIN DESCRIPTION - LOCATION
LOCATION: ABDOMEN

## 2024-02-27 NOTE — CARE PLAN
The patient's goals for the shift include keep pt free from injury    The clinical goals for the shift include pain management

## 2024-02-27 NOTE — PROGRESS NOTES
"Ludmila Escamilla \"Lubna\" is a 68 y.o. female on day 1 of admission presenting with Incarcerated hernia.     02/27/24 1006   Discharge Planning   Living Arrangements Spouse/significant other   Support Systems Spouse/significant other   Assistance Needed independent, uses cane for long distances   Type of Residence Private residence   Home or Post Acute Services None   Does the patient need discharge transport arranged? Yes   RoundTrip coordination needed? Yes   Has discharge transport been arranged? No   Financial Resource Strain   How hard is it for you to pay for the very basics like food, housing, medical care, and heating? Not hard   Housing Stability   In the last 12 months, was there a time when you were not able to pay the mortgage or rent on time? N   In the last 12 months, was there a time when you did not have a steady place to sleep or slept in a shelter (including now)? N   Transportation Needs   In the past 12 months, has lack of transportation kept you from medical appointments or from getting medications? no   In the past 12 months, has lack of transportation kept you from meetings, work, or from getting things needed for daily living? No     Spoke with patient to discuss her preferences for care. Discussed how patient manages health at home. Liveshome with spouse. Independent in all ADLS.  No home O2, dialysis, or assistive devices. Patient denies any problems getting to appointments or obtaining/affording medications.  No additional resources or needs identified.    Plan: admitted s/p removal strangulated hernia. States pain is controlled at the moment. Will need to advance diet to see if patient is able to tolerate.   Status: inpatient  Payor: [employer] medicare  Disposition: Home  Barrier: pain control, tolerate diet  ADOD:   1-2 days      Kate Moseley RN      "

## 2024-02-27 NOTE — ANESTHESIA PROCEDURE NOTES
Airway  Date/Time: 2/26/2024 10:22 PM  Urgency: elective    Airway not difficult    Staffing  Performed: AJ   Authorized by: Arden Liriano MD    Performed by: AJ Humphries  Patient location during procedure: OR    Indications and Patient Condition  Indications for airway management: anesthesia  Spontaneous ventilation: present  Sedation level: moderate (conscious sedation)  Preoxygenated: yes  Patient position: sniffing  Mask difficulty assessment: 0 - not attempted  Planned trial extubation    Final Airway Details  Final airway type: endotracheal airway      Successful airway: ETT  Cuffed: yes   Successful intubation technique: direct laryngoscopy  Facilitating devices/methods: cricoid pressure and intubating stylet  Endotracheal tube insertion site: oral  Blade: Cyn  Blade size: #3  ETT size (mm): 7.0  Cormack-Lehane Classification: grade I - full view of glottis  Placement verified by: capnometry   Measured from: lips  ETT to lips (cm): 20

## 2024-02-27 NOTE — PROGRESS NOTES
Lubna Escamilla is a 68 y.o. female on day 1 of admission presenting with Incarcerated hernia.    Subjective   NIKOLAI. Patient doing well this morning. Has not eaten since surgery. -flatus, -BM.     Objective     Physical Exam  Constitutional:       Appearance: Normal appearance.   HENT:      Head: Normocephalic.      Nose: Nose normal.      Mouth/Throat:      Mouth: Mucous membranes are moist.   Eyes:      Extraocular Movements: Extraocular movements intact.      Pupils: Pupils are equal, round, and reactive to light.   Cardiovascular:      Rate and Rhythm: Normal rate and regular rhythm.   Pulmonary:      Effort: Pulmonary effort is normal.      Breath sounds: Normal breath sounds.   Abdominal:      General: Abdomen is flat. Bowel sounds are normal. There is distension (mild, soft).      Palpations: Abdomen is soft.      Tenderness: There is abdominal tenderness (appropiately).      Comments: TAMAR wetzel  NITIN 50cc/24hr- sanguinous   Musculoskeletal:         General: No swelling. Normal range of motion.   Skin:     General: Skin is warm and dry.      Capillary Refill: Capillary refill takes less than 2 seconds.   Neurological:      General: No focal deficit present.      Mental Status: She is alert and oriented to person, place, and time.   Psychiatric:         Mood and Affect: Mood normal.         Behavior: Behavior normal.         Thought Content: Thought content normal.         Judgment: Judgment normal.       Last Recorded Vitals  Blood pressure 126/70, pulse 84, temperature 36.9 °C (98.4 °F), temperature source Temporal, resp. rate 18, height 1.524 m (5'), weight 49.9 kg (110 lb), SpO2 99 %.  Intake/Output last 3 Shifts:  I/O last 3 completed shifts:  In: 700 (14 mL/kg) [I.V.:700 (14 mL/kg)]  Out: 210 (4.2 mL/kg) [Urine:150 (0.1 mL/kg/hr); Drains:50; Blood:10]  Weight: 49.9 kg     Relevant Results  Scheduled medications  acetaminophen, 650 mg, oral, q6h  enoxaparin, 40 mg, subcutaneous, q24h  gabapentin, 300 mg, oral,  TID  [Held by provider] ketorolac, 15 mg, intravenous, q6h GENESIS  piperacillin-tazobactam, 3.375 g, intravenous, q6h      Continuous medications  sodium chloride 0.9%, 100 mL/hr, Last Rate: 100 mL/hr (02/27/24 0214)      PRN medications  PRN medications: HYDROmorphone, naloxone, ondansetron ODT **OR** ondansetron, oxyCODONE, oxyCODONE, oxygen    .  Results for orders placed or performed during the hospital encounter of 02/26/24 (from the past 24 hour(s))   CBC with Differential   Result Value Ref Range    WBC 9.0 4.4 - 11.3 x10*3/uL    nRBC 0.0 0.0 - 0.0 /100 WBCs    RBC 4.58 4.00 - 5.20 x10*6/uL    Hemoglobin 13.8 12.0 - 16.0 g/dL    Hematocrit 42.4 36.0 - 46.0 %    MCV 93 80 - 100 fL    MCH 30.1 26.0 - 34.0 pg    MCHC 32.5 32.0 - 36.0 g/dL    RDW 13.1 11.5 - 14.5 %    Platelets 306 150 - 450 x10*3/uL    Neutrophils % 84.9 40.0 - 80.0 %    Immature Granulocytes %, Automated 0.3 0.0 - 0.9 %    Lymphocytes % 8.3 13.0 - 44.0 %    Monocytes % 5.9 2.0 - 10.0 %    Eosinophils % 0.2 0.0 - 6.0 %    Basophils % 0.4 0.0 - 2.0 %    Neutrophils Absolute 7.66 1.20 - 7.70 x10*3/uL    Immature Granulocytes Absolute, Automated 0.03 0.00 - 0.70 x10*3/uL    Lymphocytes Absolute 0.75 (L) 1.20 - 4.80 x10*3/uL    Monocytes Absolute 0.53 0.10 - 1.00 x10*3/uL    Eosinophils Absolute 0.02 0.00 - 0.70 x10*3/uL    Basophils Absolute 0.04 0.00 - 0.10 x10*3/uL   Comprehensive Metabolic Panel   Result Value Ref Range    Glucose 130 (H) 74 - 99 mg/dL    Sodium 137 136 - 145 mmol/L    Potassium 4.0 3.5 - 5.3 mmol/L    Chloride 99 98 - 107 mmol/L    Bicarbonate 28 21 - 32 mmol/L    Anion Gap 14 10 - 20 mmol/L    Urea Nitrogen 12 6 - 23 mg/dL    Creatinine 0.54 0.50 - 1.05 mg/dL    eGFR >90 >60 mL/min/1.73m*2    Calcium 9.0 8.6 - 10.3 mg/dL    Albumin 4.1 3.4 - 5.0 g/dL    Alkaline Phosphatase 37 33 - 136 U/L    Total Protein 6.7 6.4 - 8.2 g/dL    AST 20 9 - 39 U/L    Bilirubin, Total 1.0 0.0 - 1.2 mg/dL    ALT 16 7 - 45 U/L   Protime-INR   Result  Value Ref Range    Protime 12.1 9.8 - 12.8 seconds    INR 1.1 0.9 - 1.1   aPTT   Result Value Ref Range    aPTT 27 27 - 38 seconds   Lavender Top   Result Value Ref Range    Extra Tube Hold for add-ons.    Type and Screen   Result Value Ref Range    ABO TYPE A     Rh TYPE POS     ANTIBODY SCREEN NEG    Basic Metabolic Panel   Result Value Ref Range    Glucose 138 (H) 74 - 99 mg/dL    Sodium 136 136 - 145 mmol/L    Potassium 3.9 3.5 - 5.3 mmol/L    Chloride 103 98 - 107 mmol/L    Bicarbonate 26 21 - 32 mmol/L    Anion Gap 11 10 - 20 mmol/L    Urea Nitrogen 13 6 - 23 mg/dL    Creatinine 0.54 0.50 - 1.05 mg/dL    eGFR >90 >60 mL/min/1.73m*2    Calcium 8.3 (L) 8.6 - 10.3 mg/dL   CBC   Result Value Ref Range    WBC 13.4 (H) 4.4 - 11.3 x10*3/uL    nRBC 0.0 0.0 - 0.0 /100 WBCs    RBC 3.52 (L) 4.00 - 5.20 x10*6/uL    Hemoglobin 10.8 (L) 12.0 - 16.0 g/dL    Hematocrit 31.0 (L) 36.0 - 46.0 %    MCV 88 80 - 100 fL    MCH 30.7 26.0 - 34.0 pg    MCHC 34.8 32.0 - 36.0 g/dL    RDW 13.2 11.5 - 14.5 %    Platelets 229 150 - 450 x10*3/uL     Assessment/Plan   Principal Problem:    Incarcerated hernia  Active Problems:    PONV (postoperative nausea and vomiting)    Inguinal hernia of left side with obstruction    Free intraperitoneal air    uLbna Escamilla is a 68 y.o. female on day 1 of admission presenting with Incarcerated hernia. She is now POD1 lap SBR. Intra-op findings of bloody fluid in the abdomen 1 5 cm area of ischemia bowel with focal perforation.  The bowel was in the abdominal cavity and did not require reduction.  Fluid was pushed out of the hernia and the peritoneum was closed using 3.0 PDS.    Neuro: post-surgical pain, currently well managed  - OOB/ ambulate 5x per day  - Scheduled acetaminophen  - Toradol held for hgb drop  - PRN oxy and breakthrough dilaudid unless absolutely needed    CV: RRR, BP stable  - VS every 4 hours     Pulm: Patent airways, equal and symmetrical chest expansion, on 2L NC  - IS every hour  while awake   - Pulse ox every 4 hours with VS  - May wean O2    GI: mildly distended, soft, appropriately tender, surgical site c/d/I, NITIN 50cc- sanguinous  - CLD ordered  - OOB/ambulation  - IS encourged  - DVT proph: SCDs/lovenox  - PRN antiemetic     : rothman in place  - Cr 0.54  - Electrolytes wnl  - Monitor I&Os  - Rothman discontinued    HEME: H/H 10.8/31.0 from 13.8/42.4  - DVT Proph  - SCDs/ ambulate/ lovenox  - Toradol discontinued    ID: Afebrile  - WBC 13.4 from 9.0  - Monitor for s/s infection    Dispo:  - Continue care on nursing floor  - Awaiting RBF, diet tolerance    Discussed with Dr Kovacs.    I spent 35 minutes in the professional and overall care of this patient.    Yogi Whitt PANicolásC    Pt feeling much better , no gas or rumbles    Gen well appearing   Abd - softly distended, appr tender, bruising around incision, bloody drain output  Extr - no edema    Impression - POD # 0 s/p lx SBR and repair of hernia    Plan   Continue IV abx and drain  Continue IVF, clears  SCD's/lovenox  IS/OOB  Home meds   Will await some bowel function or decreased bloating before advancing diet  Rothman out   Keep drain for now

## 2024-02-27 NOTE — CARE PLAN
The patient's goals for the shift include keep pt free from injury    The clinical goals for the shift include pain management    Over the shift, the patient did not make progress toward the following goals. Barriers to progression include . Recommendations to address these barriers include .

## 2024-02-27 NOTE — OP NOTE
"Resection Laparoscopy Small Intestine; REMOVAL OF STRANGULATED HERNIA (L) Operative Note     Date: 2024  OR Location: [facility]    Name: Ludmila Escamilla \"Caitlyn", : 1955, Age: 68 y.o., MRN: [id number], Sex: female    Diagnosis  Pre-op Diagnosis     * Inguinal hernia of left side with obstruction [K40.30]     * Free intraperitoneal air [K66.8] Post-op Diagnosis     * Inguinal hernia of left side with obstruction [K40.30]     * Free intraperitoneal air [K66.8]     Procedures  Resection Laparoscopy Small Intestine; REMOVAL OF STRANGULATED HERNIA  63122 - AR LAPS ENTERECT RESCJ 1 SMALL INTEST RESCJ & OTONIEL  herniorraphy    Surgeons      * Argenis Kovacs - Primary    Resident/Fellow/Other Assistant:  Surgeon(s) and Role:    Procedure Summary  Anesthesia: General  ASA: III  Anesthesia Staff: Anesthesiologist: Arden Liriano MD  C-AA: AJ Humphries  Estimated Blood Loss: 10mL  Intra-op Medications:   Administrations occurring from 24 2200 to 24 0015:   Medication Name Total Dose   BUPivacaine HCl (Marcaine) 0.5 % (5 mg/mL) 30 mL in sodium chloride 0.9% 30 mL syringe 50 mL              Anesthesia Record               Intraprocedure I/O Totals          Intake    Remifentanil Drip 0.00 mL    The total shown is the total volume documented since Anesthesia Start was filed.    Propofol Drip 0.00 mL    The total shown is the total volume documented since Anesthesia Start was filed.    Total Intake 0 mL       Output    Est. Blood Loss 10 mL    Total Output 10 mL       Net    Net Volume -10 mL          Specimen:   ID Type Source Tests Collected by Time   1 : SMALL BOWEL RESECTION Tissue SMALL BOWEL /INTESTINE SEGMENTAL RESECTION SURGICAL PATHOLOGY EXAM Argenis Kovacs MD 2024 2305        Staff:   Circulator: Lindsey Brown RN  Scrub Person: Juan Duran         Drains and/or Catheters:   Closed/Suction Drain Anterior Abdomen 19 Fr. (Active)       Urethral Catheter 16 Fr. " (Active)         Findings: bloody fluid in the abdomen 1 5 cm area of ischemia bowel with focal perforation.  The bowel was in the abdominal cavity and did not require reduction.  Fluid was pushed out of the hernia and the peritoneum was closed using 3.0 PDS.      Indications: Lubna Escamilla is an 68 y.o. female who is having surgery for Inguinal hernia of left side with obstruction [K40.30]  Free intraperitoneal air [K66.8].     The patient was seen in the preoperative area. The risks, benefits, complications, treatment options, non-operative alternatives, expected recovery and outcomes were discussed with the patient. The possibilities of reaction to medication, pulmonary aspiration, injury to surrounding structures, bleeding, recurrent infection, the need for additional procedures, failure to diagnose a condition, and creating a complication requiring transfusion or operation were discussed with the patient. The patient concurred with the proposed plan, giving informed consent.  The site of surgery was properly noted/marked if necessary per policy. The patient has been actively warmed in preoperative area. Preoperative antibiotics have been ordered and given within 2 hours of incision. Venous thrombosis prophylaxis have been ordered including bilateral sequential compression devices and chemical prophylaxis    Procedure Details: The patient was brought to the operating room and moved to the table in the supine position, general endotracheal anesthesia was induced IV antibiotics and a heparin shot were administered and she was prepped using ChloraPrep was allowed to dry for 3 minutes.  She was put in the split leg position and taped and draped in the usual sterile fashion.    A 3 cm periumbilical incision was made using the knife the subcutaneous tissues were divided using the knife as was the fascia.  A finger sweep was performed there were no adhesions the GelPort wound protector was placed using 2-10s and 1-12  mm port.  We then inspected the peritoneal cavity.    There was clear bloody serosanguineous drainage in the pelvis this was irrigated out with 1 L of sterile saline and suctioned out.  There was 1 obviously injected and indurated short segment of small bowel that was clearly the area that had been incarcerated.  The hernia left inguinal hernia was identified and was quite small.  We switched out any fluid that was left in the hernia sac and the hernia sac was cinched down using a circular 3-0 absorbable V-Loc suture with overlapping figure-of-eight sutures.  A 5 mm port site was placed in the right lower quadrant to perform that.  A four-quadrant tap block was performed instilling 10 cc of quarter percent Marcaine right upper quadrant right lower quadrant left upper and left lower quadrant under direct vision.  A 19 Tamazight Grant drain was placed and brought out through the 5 mm port site and placed in the pelvis.    The indurated area of small bowel was grasped and brought out through the wound protector with ease.  The there was a clear perforation in this area of small bowel the proximal and distal appeared normal.  2 enterotomies were made in the normal bowel proximal and distal.  A side-to-side ISAURA 75 blue load stapler was performed.  The  remainder of the perforation and the indurated area were resected using reload of the ISAURA 75 green load stapler.  This was oversewn using interrupted figure-of-eight 3-0 Vicryl's to stop the bleeding.  The bowel was then returned to the peritoneal cavity clean closure was performed the fascia was using interrupted figure-of-eight 0 PDS sutures the skin was closed using Monocryl and LiquiBand.  A drain stitch was placed on the drain.   Complications:  None; patient tolerated the procedure well.    Disposition: PACU - hemodynamically stable.  Condition: stable       Attending Attestation: I performed the procedure.    Argenis Kovacs  Phone Number: [phone]

## 2024-02-27 NOTE — H&P
History Of Present Illness  Lubna Escamilla is a 68 y.o. female presenting with abdominal pain that started this AM after water aerobics.  She has a history of L inguinal hernia that has popped in an out over the past 2 years.  She had a normal BM in the AM.  Went to water aerobics.  The hernia popped out and hurt more than usual and she was unable to reduce it.  The pain got worse and worse through the day and she presented to the ED.  Since being here she has a low grade fever and chills.       Past Medical History  Past Medical History:   Diagnosis Date    HTN (hypertension)     Limb girdle muscular dystrophy, unspecified (CMS/HCC)        Surgical History  Past Surgical History:   Procedure Laterality Date    COLONOSCOPY      DILATION AND CURETTAGE OF UTERUS  06/27/2023    HIP RESURFACING Left     LASIK      TOTAL HIP ARTHROPLASTY Right         Social History  She has no history on file for tobacco use, alcohol use, and drug use.    Family History  No family history on file.   Brother DMD  Mom CHF  Dad CAD    Allergies  Patient has no known allergies.    Review of Systems     Physical Exam  Constitutional: Well developed, awake/alert/oriented x3, no distress, alert and cooperative   Eyes: Sclera anicteric, no conjunctival inflammation, conjugate gaze   ENMT: mucous membranes moist, no apparent injury,   Head/Neck: Neck supple, no apparent injury, No JVD, trachea midline, no bruits   Respiratory/Thorax: Patent airways, CTAB, normal breath sounds with good chest expansion, thorax symmetric   Cardiovascular: Regular, rate and rhythm, no murmurs, normal S1 and S2   Gastrointestinal: Nondistended, soft, tender over the anterior n, no organomegaly, +BS, no bruits, left groin swollen  Extremities: normal extremities, no cyanosis edema, contusions or wounds, 2+ femoral pulses B/L   Neurological: alert and oriented x3, normal strength, Normal gait   Lymphatic: No palpable inguinal lymphadenopathy   Psychological:  Appropriate mood and behavior   Skin: Warm and dry, no lesions, no rashes        Last Recorded Vitals  Blood pressure 145/67, pulse 99, temperature 36.7 °C (98.1 °F), temperature source Tympanic, resp. rate 18, height 1.524 m (5'), weight 49.9 kg (110 lb), SpO2 97 %.    === 02/26/24 ===    CT ABDOMEN PELVIS W IV CONTRAST    - Impression -  1.  Evidence of free intraperitoneal air concerning for perforated  viscus. There is a thickened small bowel loops seen in the pelvis  somewhat difficult to evaluate due to bilateral hip arthroplasties.  Inflammation and air seen adjacent to it. There is an inguinal hernia  on the left containing fluid and air. I suspect that this is the  source of perforated viscus and may be related to ischemic changes.  Extensive diverticulosis. Surgical consultation advised.  2. Mild fatty infiltration of the liver.  3. Stable left hyperdense renal cyst      MACRO:  Fredo Wong discussed the significance and urgency of this critical  finding by epic chat with  KATHLEEN COLON on 2/26/2024 at 8:34 pm.  (**-RCF-**) Findings:  See findings.    Signed by: Fredo Wong 2/26/2024 8:34 PM  Dictation workstation:   [id number]       Lab Results   Component Value Date    WBC 9.0 02/26/2024    HGB 13.8 02/26/2024    HCT 42.4 02/26/2024    MCV 93 02/26/2024     02/26/2024     .lastcmp    Assessment/Plan   Principal Problem:    Incarcerated hernia  Active Problems:    Inguinal hernia of left side with obstruction    Free intraperitoneal air    Impression:  Perforated small bowel in incarcerated/strangulated L inguinal hernia    Plan   Zosyn,   IVF  To OR for emergent SBR and primary repair of hernia tonight        I spent 60 minutes in the professional and overall care of this patient.      Argenis Kovacs MD

## 2024-02-27 NOTE — ANESTHESIA PREPROCEDURE EVALUATION
"Patient: Ludmila Escamilla \"Lubna\"    Procedure Information       Date: 02/26/24    Procedure: Resection Laparoscopy Small Intestine (Left)    Location: [facility]    Surgeons: Argenis Kovacs MD            Relevant Problems   Anesthesia   (+) PONV (postoperative nausea and vomiting)      Digestive   (+) Free intraperitoneal air   (+) Incarcerated hernia       Clinical information reviewed:    Allergies  Meds   Med Hx  Surg Hx  OB Status  Fam Hx  Soc Hx        NPO/Void Status  Date of Last Liquid: 02/26/24  Time of Last Liquid: 1100  Date of Last Solid: 02/26/24  Time of Last Solid: 0800  Last Intake Type: Light meal           Past Medical History:   Diagnosis Date    HTN (hypertension)     Limb girdle muscular dystrophy, unspecified (CMS/HCC)       Past Surgical History:   Procedure Laterality Date    COLONOSCOPY      DILATION AND CURETTAGE OF UTERUS  06/27/2023    HIP RESURFACING Bilateral     right 2011, left 2012    LASIK      TOTAL HIP ARTHROPLASTY Bilateral 2013    Conversion from hip resurfacing to total, right 6/2013, left 12/2013         No current outpatient medications   Allergies   Allergen Reactions    Succinylcholine Other     Risk of rhabdomyolysis due to limb-girdle muscular dystrophy        Chemistry    Lab Results   Component Value Date/Time     02/26/2024 1351    K 4.0 02/26/2024 1351    CL 99 02/26/2024 1351    CO2 28 02/26/2024 1351    BUN 12 02/26/2024 1351    CREATININE 0.54 02/26/2024 1351    Lab Results   Component Value Date/Time    CALCIUM 9.0 02/26/2024 1351    ALKPHOS 37 02/26/2024 1351    AST 20 02/26/2024 1351    ALT 16 02/26/2024 1351    BILITOT 1.0 02/26/2024 1351          Lab Results   Component Value Date/Time    WBC 9.0 02/26/2024 1351    HGB 13.8 02/26/2024 1351    HCT 42.4 02/26/2024 1351     02/26/2024 1351     Lab Results   Component Value Date/Time    PROTIME 12.1 02/26/2024 2044    INR 1.1 02/26/2024 2044     No results found for this or any " "previous visit (from the past 4464 hour(s)).  No results found for this or any previous visit from the past 1095 days.   Echo 5/2/2013@[facility]:  LEFT VENTRICLE   The left ventricle is normal in size.   Left ventricular systolic function is normal.   Baseline left ventricular diastolic function is normal.   Mitral annular lateral E/e\": 5.7. Mitral annular septal E/e\": 11.4.     RIGHT VENTRICLE   The right ventricle is normal in size.   Right ventricular systolic function is normal.   Estimated right ventricular systolic pressure is 25 mmHg consistent with normal   pulmonary artery pressures. Estimated right atrial pressure is 0 mmHg.     LEFT ATRIUM   The left atrial cavity is normal in size.   Pulmonary Veins:   The pulmonary venous pattern showed normal systolic flow.     RIGHT ATRIUM   The right atrial cavity is normal in size.   Inferior Vena Cava:   The inferior vena cava appears small measuring 1.1 cm. The vessel decreases   greater than 50 percent with inspiration.     MITRAL VALVE   The mitral valve leaflets are structurally normal. Native. There is no mitral   stenosis. There is trivial mitral valve regurgitation. The pressure half time is   75 msec. The peak mitral E/A ratio is 1.15. The average mitral E/e\" ratio is 8.6.   The mitral flow deceleration time is 257 msec.     TRICUSPID VALVE   The tricuspid valve leaflets are structurally normal. Native. There is no   tricuspid stenosis. There is trivial tricuspid valve regurgitation. The hepatic   venous pattern showed normal systolic flow.     AORTIC VALVE   There is no aortic valve stenosis. There is no aortic valve regurgitation.   Tricuspid aortic valve. There is mild thickening. There is mild calcification.     PULMONIC VALVE   The pulmonic valve cusps are structurally normal. There is no pulmonic stenosis.   There is no pulmonic valve regurgitation.     AORTA   The visualized aorta is normal in size.   Measurements - Mid ascending aorta 2.5 cm. "     PULMONARY ARTERIES   The pulmonary arteries are normal.     INTERATRIAL SEPTUM   The interatrial septum is normal.     INTERVENTRICULAR SEPTUM   The interventricular septum is normal.     PERICARDIUM   The pericardium is normal.     CONCLUSIONS:   - Exam indication: SOB   - The left ventricle is normal in size. Left ventricular systolic function is   normal. EF = 61 ± 5% (2D 4-ch.) Baseline left ventricular diastolic function is   normal.   - The right ventricle is normal in size. Right ventricular systolic function is   normal.   - No prior echocardiographic exam available for comparison.     Visit Vitals  /73   Pulse 101   Temp 37.6 °C (99.7 °F) (Temporal)   Resp 20   Ht 1.524 m (5')   Wt 49.9 kg (110 lb)   SpO2 98%   BMI 21.48 kg/m²   OB Status Postmenopausal   BSA 1.45 m²        Physical Exam    Airway  Mallampati: III  TM distance: >3 FB  Neck ROM: full     Cardiovascular   Rhythm: regular  Rate: normal     Dental - normal exam     Pulmonary   Breath sounds clear to auscultation     Abdominal - normal exam              Anesthesia Plan    History of general anesthesia?: yes  History of complications of general anesthesia?: no    ASA 3 - emergent     general   (General with ETT, TIVA, avoid SUX and inhalational due to Limb-girdle muscular dystrophy)  intravenous induction   Postoperative administration of opioids is intended.  Anesthetic plan and risks discussed with patient.    Plan discussed with CAA and CRNA.

## 2024-02-27 NOTE — CONSULTS
Nutrition Education Note  Nutrition Assessment      Reason for Assessment  Reason for Assessment: Provider consult order Minimal residue/low fiber diet education    Chart reviewed and pt visited.    Lubna is a 68 y.o. female admitted for incarcerated hernia    Discussed minimal residue/low fiber diet with pt. Left pt with handout.    History:  Past Medical History:   Diagnosis Date    HTN (hypertension)     Limb girdle muscular dystrophy, unspecified (CMS/HCC)      Anthropometrics:  Height: 152.4 cm (5')  Weight: 49.9 kg (110 lb)  BMI (Calculated): 21.48    Weight Change: 0    Wt Readings from Last 5 Encounters:   02/27/24 49.9 kg (110 lb)   01/04/24 49.9 kg (110 lb 0.2 oz)   07/19/23 49.9 kg (110 lb)   06/21/23 49.9 kg (110 lb)   05/24/23 49.9 kg (110 lb)     Weight Change  Significant Weight Loss: No     IBW/kg (Dietitian Calculated): 45.5 kg  Percent of IBW: 110 %     Education Documentation  Nutrition Care Manual, taught by Martine Carlin RDN, LD at 2/27/2024 10:40 AM.  Learner: Patient  Readiness: Acceptance  Method: Explanation, Handout  Response: Verbalizes Understanding   Discussed low fiber diet with pt. Discussed foods recommended to consume and food recommended to avoid once diet advances.  Left pt with handout: minimal residue and low fiber     Follow Up  Time Spent (min): 30 minutes  Last Date of Nutrition Visit: 02/27/24  Nutrition Follow-Up Needed?: Dietitian to reassess per policy  Follow up Comment: Low fiber diet education- Wes

## 2024-02-27 NOTE — PROGRESS NOTES
"Pharmacy Medication History Review    Ludmila Escamilla \"Lubna\" is a 68 y.o. female admitted for Incarcerated hernia. Pharmacy reviewed the patient's [id number] medications and allergies for accuracy.    The list below reflectives the updated PTA list. Please review each medication in order reconciliation for additional clarification and justification.  Prior to Admission Medications   Prescriptions Last Dose Informant     Chiqui 0.025 mg/24 hr patch Past Week      Sig: Place 2 patches on the skin 2 times a week.   amLODIPine-benazepriL (Lotrel) 5-10 mg capsule 2/25/2024      Sig: Take 1 capsule by mouth once daily.   progesterone (Prometrium) 100 mg capsule 2/25/2024      Sig: Take 1 capsule (100 mg) by mouth once daily.      Facility-Administered Medications: None      Allergies  Reviewed by Valentine Pederson RN on 2/26/2024        Severity Reactions Comments    Succinylcholine High Other Risk of rhabdomyolysis due to limb-girdle muscular dystrophy            Below are additional concerns with the patient's PTA list.  Patient verified all medications and doses.    Keren Chavira CPhT    "

## 2024-02-28 LAB
ANION GAP SERPL CALC-SCNC: 9 MMOL/L (ref 10–20)
BUN SERPL-MCNC: 11 MG/DL (ref 6–23)
CALCIUM SERPL-MCNC: 8.1 MG/DL (ref 8.6–10.3)
CHLORIDE SERPL-SCNC: 105 MMOL/L (ref 98–107)
CO2 SERPL-SCNC: 28 MMOL/L (ref 21–32)
CREAT SERPL-MCNC: 0.6 MG/DL (ref 0.5–1.05)
EGFRCR SERPLBLD CKD-EPI 2021: >90 ML/MIN/1.73M*2
ERYTHROCYTE [DISTWIDTH] IN BLOOD BY AUTOMATED COUNT: 13.3 % (ref 11.5–14.5)
GLUCOSE SERPL-MCNC: 91 MG/DL (ref 74–99)
HCT VFR BLD AUTO: 26.9 % (ref 36–46)
HGB BLD-MCNC: 8.6 G/DL (ref 12–16)
MCH RBC QN AUTO: 30.3 PG (ref 26–34)
MCHC RBC AUTO-ENTMCNC: 32 G/DL (ref 32–36)
MCV RBC AUTO: 95 FL (ref 80–100)
NRBC BLD-RTO: 0 /100 WBCS (ref 0–0)
PLATELET # BLD AUTO: 211 X10*3/UL (ref 150–450)
POTASSIUM SERPL-SCNC: 3.7 MMOL/L (ref 3.5–5.3)
RBC # BLD AUTO: 2.84 X10*6/UL (ref 4–5.2)
SODIUM SERPL-SCNC: 138 MMOL/L (ref 136–145)
WBC # BLD AUTO: 6.1 X10*3/UL (ref 4.4–11.3)

## 2024-02-28 PROCEDURE — 1100000001 HC PRIVATE ROOM DAILY

## 2024-02-28 PROCEDURE — 36415 COLL VENOUS BLD VENIPUNCTURE: CPT | Performed by: COLON & RECTAL SURGERY

## 2024-02-28 PROCEDURE — 85027 COMPLETE CBC AUTOMATED: CPT | Performed by: COLON & RECTAL SURGERY

## 2024-02-28 PROCEDURE — 2500000004 HC RX 250 GENERAL PHARMACY W/ HCPCS (ALT 636 FOR OP/ED)

## 2024-02-28 PROCEDURE — 80048 BASIC METABOLIC PNL TOTAL CA: CPT | Performed by: COLON & RECTAL SURGERY

## 2024-02-28 PROCEDURE — 2500000004 HC RX 250 GENERAL PHARMACY W/ HCPCS (ALT 636 FOR OP/ED): Performed by: COLON & RECTAL SURGERY

## 2024-02-28 PROCEDURE — 99232 SBSQ HOSP IP/OBS MODERATE 35: CPT

## 2024-02-28 PROCEDURE — 2500000001 HC RX 250 WO HCPCS SELF ADMINISTERED DRUGS (ALT 637 FOR MEDICARE OP): Performed by: COLON & RECTAL SURGERY

## 2024-02-28 RX ADMIN — ACETAMINOPHEN 650 MG: 325 TABLET ORAL at 20:46

## 2024-02-28 RX ADMIN — PIPERACILLIN SODIUM AND TAZOBACTAM SODIUM 3.38 G: 3; .375 INJECTION, SOLUTION INTRAVENOUS at 15:37

## 2024-02-28 RX ADMIN — GABAPENTIN 300 MG: 300 CAPSULE ORAL at 20:48

## 2024-02-28 RX ADMIN — SODIUM CHLORIDE 50 ML/HR: 9 INJECTION, SOLUTION INTRAVENOUS at 14:09

## 2024-02-28 RX ADMIN — SODIUM CHLORIDE 100 ML/HR: 9 INJECTION, SOLUTION INTRAVENOUS at 02:49

## 2024-02-28 RX ADMIN — ACETAMINOPHEN 650 MG: 325 TABLET ORAL at 08:25

## 2024-02-28 RX ADMIN — IRON SUCROSE 100 MG: 20 INJECTION, SOLUTION INTRAVENOUS at 14:09

## 2024-02-28 RX ADMIN — PIPERACILLIN SODIUM AND TAZOBACTAM SODIUM 3.38 G: 3; .375 INJECTION, SOLUTION INTRAVENOUS at 02:49

## 2024-02-28 RX ADMIN — ACETAMINOPHEN 650 MG: 325 TABLET ORAL at 14:09

## 2024-02-28 RX ADMIN — ENOXAPARIN SODIUM 40 MG: 40 INJECTION SUBCUTANEOUS at 03:38

## 2024-02-28 RX ADMIN — ACETAMINOPHEN 650 MG: 325 TABLET ORAL at 02:00

## 2024-02-28 RX ADMIN — GABAPENTIN 300 MG: 300 CAPSULE ORAL at 08:25

## 2024-02-28 RX ADMIN — PIPERACILLIN SODIUM AND TAZOBACTAM SODIUM 3.38 G: 3; .375 INJECTION, SOLUTION INTRAVENOUS at 08:25

## 2024-02-28 RX ADMIN — PIPERACILLIN SODIUM AND TAZOBACTAM SODIUM 3.38 G: 3; .375 INJECTION, SOLUTION INTRAVENOUS at 20:45

## 2024-02-28 RX ADMIN — GABAPENTIN 300 MG: 300 CAPSULE ORAL at 14:09

## 2024-02-28 ASSESSMENT — PAIN SCALES - GENERAL
PAINLEVEL_OUTOF10: 5 - MODERATE PAIN
PAINLEVEL_OUTOF10: 6
PAINLEVEL_OUTOF10: 3

## 2024-02-28 ASSESSMENT — PAIN - FUNCTIONAL ASSESSMENT
PAIN_FUNCTIONAL_ASSESSMENT: 0-10
PAIN_FUNCTIONAL_ASSESSMENT: 0-10

## 2024-02-28 ASSESSMENT — PAIN DESCRIPTION - LOCATION: LOCATION: INCISION

## 2024-02-28 ASSESSMENT — PAIN DESCRIPTION - ORIENTATION: ORIENTATION: MID

## 2024-02-28 NOTE — PROGRESS NOTES
Lubna Escamilla is a 68 y.o. female on day 2 of admission presenting with Incarcerated hernia.    Subjective   Feeling a little more bloated and nauseous today. Has been ambulating the halls, tolerating clear liquids. Denies vomiting, fever, chills, SOB.        Objective     PE:  Constitutional: calm and cooperative, NAD  Eyes: PERRL, clear sclera   ENMT: Moist mucous membranes  Head/Neck: Neck supple, no JVD  Cardiovascular: RRR. 2+ equal pulses of the distal extremities.  Respiratory/Thorax: CTAB. Good symmetric chest expansion. On 2 L NC  Gastrointestinal: Abdomen bloated, soft, appropriately tender, no peritoneal signs. Laparotomy sites c/d/i, well approximate with Dermabond, no erythema or drainage. Mild bruising around umbilical incision. NITIN with with serosang output.   Genitourinary: Voiding independently   Musculoskeletal: ROM intact, no joint swelling, generalized weakness   Extremities: No peripheral edema  Neurological: A&Ox3, No focal deficits   Psychological: Appropriate mood and behavior  Skin: Warm and dry. Slight edema BLE      Last Recorded Vitals  Blood pressure 117/72, pulse 89, temperature 36.8 °C (98.3 °F), temperature source Oral, resp. rate 18, height 1.524 m (5'), weight 49.9 kg (110 lb), SpO2 96 %.  Intake/Output last 3 Shifts:  I/O last 3 completed shifts:  In: 2340 (46.9 mL/kg) [P.O.:440; I.V.:1700 (34.1 mL/kg); IV Piggyback:200]  Out: 960 (19.2 mL/kg) [Urine:850 (0.5 mL/kg/hr); Drains:100; Blood:10]  Weight: 49.9 kg     Relevant Results  Scheduled medications  acetaminophen, 650 mg, oral, q6h  enoxaparin, 40 mg, subcutaneous, q24h  gabapentin, 300 mg, oral, TID  [Held by provider] ketorolac, 15 mg, intravenous, q6h GENESIS  piperacillin-tazobactam, 3.375 g, intravenous, q6h      Continuous medications  sodium chloride 0.9%, 50 mL/hr, Last Rate: 100 mL/hr (02/28/24 0249)      PRN medications  PRN medications: HYDROmorphone, naloxone, ondansetron ODT **OR** ondansetron, oxyCODONE, oxyCODONE,  oxygen  Results for orders placed or performed during the hospital encounter of 02/26/24 (from the past 24 hour(s))   Basic Metabolic Panel   Result Value Ref Range    Glucose 91 74 - 99 mg/dL    Sodium 138 136 - 145 mmol/L    Potassium 3.7 3.5 - 5.3 mmol/L    Chloride 105 98 - 107 mmol/L    Bicarbonate 28 21 - 32 mmol/L    Anion Gap 9 (L) 10 - 20 mmol/L    Urea Nitrogen 11 6 - 23 mg/dL    Creatinine 0.60 0.50 - 1.05 mg/dL    eGFR >90 >60 mL/min/1.73m*2    Calcium 8.1 (L) 8.6 - 10.3 mg/dL   CBC   Result Value Ref Range    WBC 6.1 4.4 - 11.3 x10*3/uL    nRBC 0.0 0.0 - 0.0 /100 WBCs    RBC 2.84 (L) 4.00 - 5.20 x10*6/uL    Hemoglobin 8.6 (L) 12.0 - 16.0 g/dL    Hematocrit 26.9 (L) 36.0 - 46.0 %    MCV 95 80 - 100 fL    MCH 30.3 26.0 - 34.0 pg    MCHC 32.0 32.0 - 36.0 g/dL    RDW 13.3 11.5 - 14.5 %    Platelets 211 150 - 450 x10*3/uL     XR chest 1 view   Final Result   1.  No evidence of acute cardiopulmonary process.                  MACRO:   None        Signed by: Tami Vyas 2/26/2024 9:12 PM   Dictation workstation:   [id number]      CT abdomen pelvis w IV contrast   Final Result   1.  Evidence of free intraperitoneal air concerning for perforated   viscus. There is a thickened small bowel loops seen in the pelvis   somewhat difficult to evaluate due to bilateral hip arthroplasties.   Inflammation and air seen adjacent to it. There is an inguinal hernia   on the left containing fluid and air. I suspect that this is the   source of perforated viscus and may be related to ischemic changes.   Extensive diverticulosis. Surgical consultation advised.   2. Mild fatty infiltration of the liver.   3. Stable left hyperdense renal cyst             MACRO:   Fredo Wong discussed the significance and urgency of this critical   finding by epic chat with  KATHLEEN COLON on 2/26/2024 at 8:34 pm.   (**-RCF-**) Findings:  See findings.        Signed by: Fredo Wong 2/26/2024 8:34 PM   Dictation workstation:    [id number]                Assessment/Plan   Principal Problem:    Incarcerated hernia  Active Problems:    PONV (postoperative nausea and vomiting)    Inguinal hernia of left side with obstruction    Free intraperitoneal air    Lubna Escamilla is a 68 y.o. female on day 2 of admission presenting with Incarcerated hernia. She is now POD 2 from laparoscopy small intestine; removal of strangulated left inguinal hernia. Intraoperative findings showed bloody fluid in the abdomen 1 5 cm area of ischemia bowel with focal perforation. The bowel was in the abdominal cavity and did not require reduction. Fluid was pushed out of the hernia and the peritoneum was closed using 3.0 PDS.     A/P: POD 2    Neuro: Acute post op pain; well manage with current regimen  Hx: Limb-Girdle Muscular Dystrophy   - scheduled tylenol and gabapentin  - PRN narcotics, avoid as able  - OOB/ ambulate 5x per day      CV: RRR, normotensive  Hx: essential HTN, HLD  - VS every 4 hours     Pulm: CTAB, on RA  - IS every hour while awake   - Pulse ox every 4 hours with VS     GI: POD 2. Abdomen bloated, soft, mildly tender. Some bruising noted around umbilical surgical site. NITIN serosang (50 ml/24 hours). +nausea, +BS, - BM  - Continue CLD while awaiting bowel function  - NITIN drain care  - PRN antiemetic   - ERAS protocol   - chewing gum    : Voiding independently    - IVF, slightly edematous in BLE- fluids reduced to 50 ml/hour  - creatinine 0.6  - Monitor I&Os     HEME: H&H downtrending--> 10.8/31--> 8.6/26.9.   - IV venofer started  - watch drain output and bruising at incisional site  - trend h&h   - DVT Proph: SCDs/ ambulate/ lovenox    ID: WBC 6.1. Intra op ischemic bowel with focal perforation   - continue zosyn   - Monitor for s/s infection    Dispo: Awaiting return of bowel function.        I spent 35 minutes in the professional and overall care of this patient.      Maryann Mast, APRN-CNP       No

## 2024-02-28 NOTE — PROGRESS NOTES
"Ludmila Escamilla \"Lubna\" is a 68 y.o. female on day 2 of admission presenting with Incarcerated hernia.    Assessment/Plan   Await return of bowel function.  Chew gum, ambulate the halls.  Possible drain removal    Subjective   Feeling a little bloated today.  No nausea vomiting.       Objective     Physical Exam  NAD  A&Ox3  Non icteric  CTA  RR  Abdomen soft mild distention with tympany.  Surgical sites clean.  Drain is scant and serous  Extremities warm, well perfused     Last Recorded Vitals  Blood pressure 111/83, pulse 77, temperature 37.1 °C (98.7 °F), temperature source Temporal, resp. rate 18, height 1.524 m (5'), weight 49.9 kg (110 lb), SpO2 99 %.  Intake/Output last 3 Shifts:  I/O last 3 completed shifts:  In: 2340 (46.9 mL/kg) [P.O.:440; I.V.:1700 (34.1 mL/kg); IV Piggyback:200]  Out: 960 (19.2 mL/kg) [Urine:850 (0.5 mL/kg/hr); Drains:100; Blood:10]  Weight: 49.9 kg     Relevant Results    Scheduled medications  acetaminophen, 650 mg, oral, q6h  enoxaparin, 40 mg, subcutaneous, q24h  gabapentin, 300 mg, oral, TID  iron sucrose, 100 mg, intravenous, Daily  [Held by provider] ketorolac, 15 mg, intravenous, q6h GENESIS  piperacillin-tazobactam, 3.375 g, intravenous, q6h      Continuous medications  sodium chloride 0.9%, 50 mL/hr, Last Rate: 50 mL/hr (02/28/24 1409)      PRN medications  PRN medications: HYDROmorphone, naloxone, ondansetron ODT **OR** ondansetron, oxyCODONE, oxyCODONE, oxygen    Results for orders placed or performed during the hospital encounter of 02/26/24 (from the past 24 hour(s))   Basic Metabolic Panel   Result Value Ref Range    Glucose 91 74 - 99 mg/dL    Sodium 138 136 - 145 mmol/L    Potassium 3.7 3.5 - 5.3 mmol/L    Chloride 105 98 - 107 mmol/L    Bicarbonate 28 21 - 32 mmol/L    Anion Gap 9 (L) 10 - 20 mmol/L    Urea Nitrogen 11 6 - 23 mg/dL    Creatinine 0.60 0.50 - 1.05 mg/dL    eGFR >90 >60 mL/min/1.73m*2    Calcium 8.1 (L) 8.6 - 10.3 mg/dL   CBC   Result Value Ref Range    WBC " 6.1 4.4 - 11.3 x10*3/uL    nRBC 0.0 0.0 - 0.0 /100 WBCs    RBC 2.84 (L) 4.00 - 5.20 x10*6/uL    Hemoglobin 8.6 (L) 12.0 - 16.0 g/dL    Hematocrit 26.9 (L) 36.0 - 46.0 %    MCV 95 80 - 100 fL    MCH 30.3 26.0 - 34.0 pg    MCHC 32.0 32.0 - 36.0 g/dL    RDW 13.3 11.5 - 14.5 %    Platelets 211 150 - 450 x10*3/uL           I spent 25 minutes in the professional and overall care of this patient.      Lloyd Berman MD

## 2024-02-28 NOTE — PROGRESS NOTES
02/28/24 1311   Discharge Planning   Patient expects to be discharged to: home no needs     Awaiting return of bowel function before ready for dc.    ADOD 2-4 days  DC home with spouse

## 2024-02-28 NOTE — CARE PLAN
The patient's goals for the shift include keep pt free from injury    The clinical goals for the shift include pain managment    Over the shift, the patient did not make progress toward the following goals. Barriers to progression include . Recommendations to address these barriers include .

## 2024-02-28 NOTE — ANESTHESIA POSTPROCEDURE EVALUATION
"Patient: Ludmila Escamilla \"Lubna\"    Procedure Summary       Date: 02/26/24 Room / Location: [facility] 04 / [facility]    Anesthesia Start: 2213 Anesthesia Stop: 2355    Procedure: Resection Laparoscopy Small Intestine; REMOVAL OF STRANGULATED HERNIA (Left) Diagnosis:       Inguinal hernia of left side with obstruction      Free intraperitoneal air      (Inguinal hernia of left side with obstruction [K40.30])      (Free intraperitoneal air [K66.8])    Surgeons: Argenis Kovacs MD Responsible Provider: Arden Liriano MD    Anesthesia Type: general ASA Status: 3 - Emergent            Anesthesia Type: general    Vitals Value Taken Time   /79 02/27/24 0045   Temp 37 °C (98.6 °F) 02/27/24 0045   Pulse 104 02/27/24 0045   Resp 11 02/27/24 0045   SpO2 98 % 02/27/24 0045       Anesthesia Post Evaluation    Patient location during evaluation: PACU  Patient participation: complete - patient participated  Level of consciousness: awake and alert  Pain management: adequate  Airway patency: patent  Cardiovascular status: acceptable and hemodynamically stable  Respiratory status: acceptable, spontaneous ventilation and nonlabored ventilation  Hydration status: acceptable  Postoperative Nausea and Vomiting: none        No notable events documented.    "

## 2024-02-29 LAB
ANION GAP SERPL CALC-SCNC: 7 MMOL/L (ref 10–20)
BUN SERPL-MCNC: 7 MG/DL (ref 6–23)
CALCIUM SERPL-MCNC: 7.9 MG/DL (ref 8.6–10.3)
CHLORIDE SERPL-SCNC: 108 MMOL/L (ref 98–107)
CO2 SERPL-SCNC: 31 MMOL/L (ref 21–32)
CREAT SERPL-MCNC: 0.54 MG/DL (ref 0.5–1.05)
EGFRCR SERPLBLD CKD-EPI 2021: >90 ML/MIN/1.73M*2
ERYTHROCYTE [DISTWIDTH] IN BLOOD BY AUTOMATED COUNT: 13.2 % (ref 11.5–14.5)
GLUCOSE SERPL-MCNC: 92 MG/DL (ref 74–99)
HCT VFR BLD AUTO: 24.4 % (ref 36–46)
HGB BLD-MCNC: 8.2 G/DL (ref 12–16)
LABORATORY COMMENT REPORT: NORMAL
MCH RBC QN AUTO: 30.4 PG (ref 26–34)
MCHC RBC AUTO-ENTMCNC: 33.6 G/DL (ref 32–36)
MCV RBC AUTO: 90 FL (ref 80–100)
NRBC BLD-RTO: 0 /100 WBCS (ref 0–0)
PATH REPORT.FINAL DX SPEC: NORMAL
PATH REPORT.GROSS SPEC: NORMAL
PATH REPORT.RELEVANT HX SPEC: NORMAL
PATH REPORT.TOTAL CANCER: NORMAL
PLATELET # BLD AUTO: 196 X10*3/UL (ref 150–450)
POTASSIUM SERPL-SCNC: 3.4 MMOL/L (ref 3.5–5.3)
RBC # BLD AUTO: 2.7 X10*6/UL (ref 4–5.2)
SODIUM SERPL-SCNC: 143 MMOL/L (ref 136–145)
WBC # BLD AUTO: 5 X10*3/UL (ref 4.4–11.3)

## 2024-02-29 PROCEDURE — 80048 BASIC METABOLIC PNL TOTAL CA: CPT

## 2024-02-29 PROCEDURE — 2500000004 HC RX 250 GENERAL PHARMACY W/ HCPCS (ALT 636 FOR OP/ED): Performed by: COLON & RECTAL SURGERY

## 2024-02-29 PROCEDURE — 1100000001 HC PRIVATE ROOM DAILY

## 2024-02-29 PROCEDURE — 99232 SBSQ HOSP IP/OBS MODERATE 35: CPT

## 2024-02-29 PROCEDURE — 85027 COMPLETE CBC AUTOMATED: CPT

## 2024-02-29 PROCEDURE — 2500000004 HC RX 250 GENERAL PHARMACY W/ HCPCS (ALT 636 FOR OP/ED)

## 2024-02-29 PROCEDURE — 2500000001 HC RX 250 WO HCPCS SELF ADMINISTERED DRUGS (ALT 637 FOR MEDICARE OP): Performed by: COLON & RECTAL SURGERY

## 2024-02-29 PROCEDURE — 36415 COLL VENOUS BLD VENIPUNCTURE: CPT

## 2024-02-29 PROCEDURE — 2500000002 HC RX 250 W HCPCS SELF ADMINISTERED DRUGS (ALT 637 FOR MEDICARE OP, ALT 636 FOR OP/ED)

## 2024-02-29 RX ORDER — POTASSIUM CHLORIDE 20 MEQ/1
20 TABLET, EXTENDED RELEASE ORAL ONCE
Status: COMPLETED | OUTPATIENT
Start: 2024-02-29 | End: 2024-02-29

## 2024-02-29 RX ADMIN — PIPERACILLIN SODIUM AND TAZOBACTAM SODIUM 3.38 G: 3; .375 INJECTION, SOLUTION INTRAVENOUS at 20:21

## 2024-02-29 RX ADMIN — ACETAMINOPHEN 650 MG: 325 TABLET ORAL at 02:27

## 2024-02-29 RX ADMIN — GABAPENTIN 300 MG: 300 CAPSULE ORAL at 20:21

## 2024-02-29 RX ADMIN — POTASSIUM CHLORIDE 20 MEQ: 1500 TABLET, EXTENDED RELEASE ORAL at 12:56

## 2024-02-29 RX ADMIN — ACETAMINOPHEN 650 MG: 325 TABLET ORAL at 15:53

## 2024-02-29 RX ADMIN — ACETAMINOPHEN 650 MG: 325 TABLET ORAL at 20:21

## 2024-02-29 RX ADMIN — ACETAMINOPHEN 650 MG: 325 TABLET ORAL at 09:58

## 2024-02-29 RX ADMIN — GABAPENTIN 300 MG: 300 CAPSULE ORAL at 15:53

## 2024-02-29 RX ADMIN — PIPERACILLIN SODIUM AND TAZOBACTAM SODIUM 3.38 G: 3; .375 INJECTION, SOLUTION INTRAVENOUS at 10:02

## 2024-02-29 RX ADMIN — PIPERACILLIN SODIUM AND TAZOBACTAM SODIUM 3.38 G: 3; .375 INJECTION, SOLUTION INTRAVENOUS at 15:53

## 2024-02-29 RX ADMIN — PIPERACILLIN SODIUM AND TAZOBACTAM SODIUM 3.38 G: 3; .375 INJECTION, SOLUTION INTRAVENOUS at 02:28

## 2024-02-29 RX ADMIN — IRON SUCROSE 100 MG: 20 INJECTION, SOLUTION INTRAVENOUS at 05:29

## 2024-02-29 RX ADMIN — ENOXAPARIN SODIUM 40 MG: 40 INJECTION SUBCUTANEOUS at 02:28

## 2024-02-29 RX ADMIN — GABAPENTIN 300 MG: 300 CAPSULE ORAL at 10:01

## 2024-02-29 ASSESSMENT — PAIN SCALES - GENERAL
PAINLEVEL_OUTOF10: 0 - NO PAIN
PAINLEVEL_OUTOF10: 2
PAINLEVEL_OUTOF10: 0 - NO PAIN
PAINLEVEL_OUTOF10: 0 - NO PAIN
PAINLEVEL_OUTOF10: 6
PAINLEVEL_OUTOF10: 6
PAINLEVEL_OUTOF10: 2

## 2024-02-29 ASSESSMENT — COGNITIVE AND FUNCTIONAL STATUS - GENERAL
WALKING IN HOSPITAL ROOM: A LITTLE
DAILY ACTIVITIY SCORE: 24
MOBILITY SCORE: 22
CLIMB 3 TO 5 STEPS WITH RAILING: A LITTLE
WALKING IN HOSPITAL ROOM: A LITTLE
MOBILITY SCORE: 20
CLIMB 3 TO 5 STEPS WITH RAILING: A LITTLE
MOVING TO AND FROM BED TO CHAIR: A LITTLE
DAILY ACTIVITIY SCORE: 23
STANDING UP FROM CHAIR USING ARMS: A LITTLE
DRESSING REGULAR LOWER BODY CLOTHING: A LITTLE

## 2024-02-29 ASSESSMENT — PAIN - FUNCTIONAL ASSESSMENT
PAIN_FUNCTIONAL_ASSESSMENT: 0-10

## 2024-02-29 ASSESSMENT — PAIN DESCRIPTION - LOCATION
LOCATION: INCISION
LOCATION: INCISION
LOCATION: ABDOMEN

## 2024-02-29 NOTE — PROGRESS NOTES
Lubna Escamilla is a 68 y.o. female on day 3 of admission presenting with Incarcerated hernia.    Subjective   NIKOLAI. Patient doing well this morning, new complaint of b/l thigh swelling without tenderness. Tolerating soft. -flatus,  small BM this AM.      Objective     Physical Exam  Constitutional:       Appearance: Normal appearance.   HENT:      Head: Normocephalic.      Nose: Nose normal.      Mouth/Throat:      Mouth: Mucous membranes are moist.   Eyes:      Extraocular Movements: Extraocular movements intact.      Pupils: Pupils are equal, round, and reactive to light.   Cardiovascular:      Rate and Rhythm: Normal rate and regular rhythm.   Pulmonary:      Effort: Pulmonary effort is normal.      Breath sounds: Normal breath sounds.   Abdominal:      General: Abdomen is flat. Bowel sounds are normal. There is distension (mild, soft).      Palpations: Abdomen is soft.      Tenderness: There is abdominal tenderness (appropiately).      Comments: TAMAR cdi  NITIN 50cc/24hr- sanguinous   Musculoskeletal:         General: No swelling (thighs mildly swollw, no swelling distally). Normal range of motion.      Comments: Thigh swelling, no erythema, warmth, pain upon palpation   Skin:     General: Skin is warm and dry.      Capillary Refill: Capillary refill takes less than 2 seconds.   Neurological:      General: No focal deficit present.      Mental Status: She is alert and oriented to person, place, and time.   Psychiatric:         Mood and Affect: Mood normal.         Behavior: Behavior normal.         Thought Content: Thought content normal.         Judgment: Judgment normal.       Last Recorded Vitals  Blood pressure 131/76, pulse 90, temperature 36.6 °C (97.9 °F), temperature source Oral, resp. rate 18, height 1.524 m (5'), weight 54.4 kg (120 lb), SpO2 95 %.  Intake/Output last 3 Shifts:  I/O last 3 completed shifts:  In: 1140 (20.9 mL/kg) [P.O.:440; IV Piggyback:700]  Out: 100 (1.8 mL/kg) [Drains:100]  Weight: 54.4  kg     Relevant Results  Scheduled medications  acetaminophen, 650 mg, oral, q6h  enoxaparin, 40 mg, subcutaneous, q24h  gabapentin, 300 mg, oral, TID  iron sucrose, 100 mg, intravenous, Daily  [Held by provider] ketorolac, 15 mg, intravenous, q6h GENESIS  piperacillin-tazobactam, 3.375 g, intravenous, q6h      Continuous medications       PRN medications  PRN medications: HYDROmorphone, naloxone, ondansetron ODT **OR** ondansetron, oxyCODONE, oxyCODONE, oxygen    .  Results for orders placed or performed during the hospital encounter of 02/26/24 (from the past 24 hour(s))   CBC   Result Value Ref Range    WBC 5.0 4.4 - 11.3 x10*3/uL    nRBC 0.0 0.0 - 0.0 /100 WBCs    RBC 2.70 (L) 4.00 - 5.20 x10*6/uL    Hemoglobin 8.2 (L) 12.0 - 16.0 g/dL    Hematocrit 24.4 (L) 36.0 - 46.0 %    MCV 90 80 - 100 fL    MCH 30.4 26.0 - 34.0 pg    MCHC 33.6 32.0 - 36.0 g/dL    RDW 13.2 11.5 - 14.5 %    Platelets 196 150 - 450 x10*3/uL   Basic Metabolic Panel   Result Value Ref Range    Glucose 92 74 - 99 mg/dL    Sodium 143 136 - 145 mmol/L    Potassium 3.4 (L) 3.5 - 5.3 mmol/L    Chloride 108 (H) 98 - 107 mmol/L    Bicarbonate 31 21 - 32 mmol/L    Anion Gap 7 (L) 10 - 20 mmol/L    Urea Nitrogen 7 6 - 23 mg/dL    Creatinine 0.54 0.50 - 1.05 mg/dL    eGFR >90 >60 mL/min/1.73m*2    Calcium 7.9 (L) 8.6 - 10.3 mg/dL     Assessment/Plan   Principal Problem:    Incarcerated hernia  Active Problems:    PONV (postoperative nausea and vomiting)    Inguinal hernia of left side with obstruction    Free intraperitoneal air    Lubna GIO Escamilla is a 68 y.o. female on day 3 of admission presenting with Incarcerated hernia. She is now POD3 lap SBR. Intra-op findings of bloody fluid in the abdomen 1 5 cm area of ischemia bowel with focal perforation.  The bowel was in the abdominal cavity and did not require reduction.  Fluid was pushed out of the hernia and the peritoneum was closed using 3.0 PDS.    Neuro: post-surgical pain, currently well managed  - OOB/  ambulate 5x per day  - Scheduled acetaminophen  - Toradol held  - PRN oxy and breakthrough dilaudid unless absolutely needed    CV: RRR, BP stable  - VS every 4 hours     Pulm: Patent airways, equal and symmetrical chest expansion  - IS every hour while awake   - Pulse ox every 4 hours with VS    GI: mildly distended, soft, appropriately tender, surgical site c/d/I, NITIN 50cc- sanguinous  - Soft diet ordered  - OOB/ambulation  - IS encourged  - DVT proph: SCDs/lovenox  - PRN antiemetic     : voids independently  - Cr 0.54  - Replaced K  - Monitor I&Os  - IVF discontinued d/t thigh swelling    HEME: H/H 8.2/24.4 from 8.6/26.9  - DVT Proph  - SCDs/ ambulate/ lovenox  - Toradol discontinued  - Trend hgb closely    ID: Afebrile  - WBC 5.0  - Monitor for s/s infection    Dispo:  - Continue care on nursing floor  - Awaiting RBF, diet tolerance, hgb stabilization    Discussed with Dr Kovacs.    I spent 35 minutes in the professional and overall care of this patient.    Yogi Whitt PA-C

## 2024-02-29 NOTE — CARE PLAN
The patient's goals for the shift include keep pt free from injury    The clinical goals for the shift include Pt will remain safe    Over the shift, the patient did make progress toward the following goals.     Problem: Pain  Goal: Takes deep breaths with improved pain control throughout the shift  Outcome: Progressing     Problem: Pain  Goal: Walks with improved pain control throughout the shift  Outcome: Progressing     Problem: Pain  Goal: Participates in PT with improved pain control throughout the shift  Outcome: Progressing

## 2024-03-01 VITALS
DIASTOLIC BLOOD PRESSURE: 76 MMHG | HEIGHT: 60 IN | BODY MASS INDEX: 23.56 KG/M2 | SYSTOLIC BLOOD PRESSURE: 121 MMHG | TEMPERATURE: 98.1 F | HEART RATE: 88 BPM | OXYGEN SATURATION: 98 % | RESPIRATION RATE: 19 BRPM | WEIGHT: 120 LBS

## 2024-03-01 PROBLEM — Z98.890 PONV (POSTOPERATIVE NAUSEA AND VOMITING): Status: RESOLVED | Noted: 2024-02-26 | Resolved: 2024-03-01

## 2024-03-01 PROBLEM — R11.2 PONV (POSTOPERATIVE NAUSEA AND VOMITING): Status: RESOLVED | Noted: 2024-02-26 | Resolved: 2024-03-01

## 2024-03-01 PROBLEM — K40.30 INGUINAL HERNIA OF LEFT SIDE WITH OBSTRUCTION: Status: RESOLVED | Noted: 2024-02-26 | Resolved: 2024-03-01

## 2024-03-01 PROBLEM — K66.8 FREE INTRAPERITONEAL AIR: Status: RESOLVED | Noted: 2024-02-26 | Resolved: 2024-03-01

## 2024-03-01 PROBLEM — K46.0 INCARCERATED HERNIA: Status: RESOLVED | Noted: 2024-02-26 | Resolved: 2024-03-01

## 2024-03-01 LAB
ANION GAP SERPL CALC-SCNC: 12 MMOL/L (ref 10–20)
BUN SERPL-MCNC: 6 MG/DL (ref 6–23)
CALCIUM SERPL-MCNC: 8 MG/DL (ref 8.6–10.3)
CHLORIDE SERPL-SCNC: 102 MMOL/L (ref 98–107)
CO2 SERPL-SCNC: 29 MMOL/L (ref 21–32)
CREAT SERPL-MCNC: 0.46 MG/DL (ref 0.5–1.05)
EGFRCR SERPLBLD CKD-EPI 2021: >90 ML/MIN/1.73M*2
ERYTHROCYTE [DISTWIDTH] IN BLOOD BY AUTOMATED COUNT: 13.2 % (ref 11.5–14.5)
GLUCOSE SERPL-MCNC: 85 MG/DL (ref 74–99)
HCT VFR BLD AUTO: 26.3 % (ref 36–46)
HGB BLD-MCNC: 8.7 G/DL (ref 12–16)
MCH RBC QN AUTO: 30.9 PG (ref 26–34)
MCHC RBC AUTO-ENTMCNC: 33.1 G/DL (ref 32–36)
MCV RBC AUTO: 93 FL (ref 80–100)
NRBC BLD-RTO: 0 /100 WBCS (ref 0–0)
PLATELET # BLD AUTO: 241 X10*3/UL (ref 150–450)
POTASSIUM SERPL-SCNC: 3.6 MMOL/L (ref 3.5–5.3)
RBC # BLD AUTO: 2.82 X10*6/UL (ref 4–5.2)
SODIUM SERPL-SCNC: 139 MMOL/L (ref 136–145)
WBC # BLD AUTO: 5.8 X10*3/UL (ref 4.4–11.3)

## 2024-03-01 PROCEDURE — 80048 BASIC METABOLIC PNL TOTAL CA: CPT

## 2024-03-01 PROCEDURE — 85027 COMPLETE CBC AUTOMATED: CPT

## 2024-03-01 PROCEDURE — 36415 COLL VENOUS BLD VENIPUNCTURE: CPT

## 2024-03-01 PROCEDURE — 2500000004 HC RX 250 GENERAL PHARMACY W/ HCPCS (ALT 636 FOR OP/ED): Performed by: COLON & RECTAL SURGERY

## 2024-03-01 PROCEDURE — 99231 SBSQ HOSP IP/OBS SF/LOW 25: CPT

## 2024-03-01 PROCEDURE — 2500000004 HC RX 250 GENERAL PHARMACY W/ HCPCS (ALT 636 FOR OP/ED)

## 2024-03-01 RX ORDER — FUROSEMIDE 10 MG/ML
20 INJECTION INTRAMUSCULAR; INTRAVENOUS ONCE
Status: COMPLETED | OUTPATIENT
Start: 2024-03-01 | End: 2024-03-01

## 2024-03-01 RX ORDER — IBUPROFEN 600 MG/1
600 TABLET ORAL EVERY 6 HOURS PRN
Qty: 56 TABLET | Refills: 0 | Status: SHIPPED | OUTPATIENT
Start: 2024-03-01 | End: 2024-03-15

## 2024-03-01 RX ORDER — FERROUS SULFATE 325(65) MG
325 TABLET ORAL
Qty: 30 TABLET | Refills: 0 | Status: SHIPPED | OUTPATIENT
Start: 2024-03-01 | End: 2024-03-31

## 2024-03-01 RX ORDER — ONDANSETRON 4 MG/1
4 TABLET, FILM COATED ORAL EVERY 8 HOURS PRN
Qty: 10 TABLET | Refills: 0 | Status: SHIPPED | OUTPATIENT
Start: 2024-03-01 | End: 2024-03-06

## 2024-03-01 RX ORDER — OXYCODONE HYDROCHLORIDE 5 MG/1
5 TABLET ORAL EVERY 6 HOURS PRN
Qty: 12 TABLET | Refills: 0 | Status: SHIPPED | OUTPATIENT
Start: 2024-03-01 | End: 2024-03-08

## 2024-03-01 RX ADMIN — IRON SUCROSE 100 MG: 20 INJECTION, SOLUTION INTRAVENOUS at 06:29

## 2024-03-01 RX ADMIN — ACETAMINOPHEN 650 MG: 325 TABLET ORAL at 03:43

## 2024-03-01 RX ADMIN — ENOXAPARIN SODIUM 40 MG: 40 INJECTION SUBCUTANEOUS at 03:43

## 2024-03-01 RX ADMIN — ACETAMINOPHEN 650 MG: 325 TABLET ORAL at 09:18

## 2024-03-01 RX ADMIN — FUROSEMIDE 20 MG: 10 INJECTION, SOLUTION INTRAMUSCULAR; INTRAVENOUS at 09:21

## 2024-03-01 RX ADMIN — PIPERACILLIN SODIUM AND TAZOBACTAM SODIUM 3.38 G: 3; .375 INJECTION, SOLUTION INTRAVENOUS at 09:18

## 2024-03-01 RX ADMIN — PIPERACILLIN SODIUM AND TAZOBACTAM SODIUM 3.38 G: 3; .375 INJECTION, SOLUTION INTRAVENOUS at 03:43

## 2024-03-01 ASSESSMENT — PAIN SCALES - GENERAL
PAINLEVEL_OUTOF10: 6
PAINLEVEL_OUTOF10: 0 - NO PAIN

## 2024-03-01 ASSESSMENT — PAIN - FUNCTIONAL ASSESSMENT: PAIN_FUNCTIONAL_ASSESSMENT: 0-10

## 2024-03-01 NOTE — DISCHARGE SUMMARY
Discharge Diagnosis  Incarcerated hernia    Issues Requiring Follow-Up  Post op check to be scheduled in 10-14 days     /76 (Patient Position: Lying)   Pulse 88   Temp 36.7 °C (98.1 °F) (Temporal)   Resp 19   Ht 1.524 m (5')   Wt 54.4 kg (120 lb)   SpO2 98%   BMI 23.44 kg/m²         Test Results Pending At Discharge  Pending Labs       No current pending labs.            Hospital Course  68 yr old female inguinal hernia obstruction/strangulation s/p Resection Laparoscopy Small Intestine; REMOVAL OF STRANGULATED HERNIA on 2/26/24 with Dr. Kovacs Please see operative report for full details. Patient tolerated the procedure well and recovered briefly in PACU before being transitioned to regular nursing floor. Post-op course was uncomplicated. Diet was advanced as tolerated.  IV medication transitioned to oral as diet advanced. On the day of discharge, the pt was tolerating a diet, pain was controlled on PO pain medication, and they were ambulating and voiding spontaneously. Pt discharged to home on 03/01/24 in stable condition with instructions to follow up as outpatient with Dr. Berman who will be taking over care for post op check and further discussion of outpatient hernia repair.    NITIN drain pulled at the bedside by this NP prior to discharge. Bulb removed from suction, stripped- SS/bloody drainage noted, suture cut, drain was pulled, intact, patient tolerated well. Incision covered with guaze and adhesive tape, not actively draining.     Pertinent Physical Exam At Time of Discharge  PE:  Constitutional: A&Ox3, calm and cooperative  Eyes: PERRL, clear sclera   ENMT: Moist mucous membranes, no apparent injuries or lesions  Head/Neck: Neck supple, no JVD  Cardiovascular: Normal rate and regular rhythm. No murmurs, rubs, or gallops. 2+ equal pulses of the distal extremities.  Respiratory/Thorax: CTAB, No rales, rhonchi, or wheezes. Good symmetric chest expansion.   Gastrointestinal: Abdomen slightly  distended, soft, appropriately tender, no peritoneal signs, laparotomy sites c/d/i, well approximate with Dermabond, no erythema or drainage    Genitourinary: Voiding independently   Musculoskeletal: ROM intact, no joint swelling, normal strength  Extremities: No peripheral edema, contusions, or wounds  Neurological: A&Ox3, No focal deficits   Psychological: Appropriate mood and behavior  Skin: Warm and dry, no rashes or lesions      Home Medications     Medication List      START taking these medications     ferrous sulfate (325 mg ferrous sulfate) tablet; Take 1 tablet by mouth   once daily with breakfast.   ibuprofen 600 mg tablet; Take 1 tablet (600 mg) by mouth every 6 hours   if needed for mild pain (1 - 3) for up to 14 days.   ondansetron 4 mg tablet; Commonly known as: Zofran; Take 1 tablet (4 mg)   by mouth every 8 hours if needed for nausea or vomiting for up to 5 days.   oxyCODONE 5 mg immediate release tablet; Commonly known as: Roxicodone;   Take 1 tablet (5 mg) by mouth every 6 hours if needed for severe pain (7 -   10) for up to 7 days.     CONTINUE taking these medications     amLODIPine-benazepriL 5-10 mg capsule; Commonly known as: Lotrel   Chiqui 0.025 mg/24 hr patch; Generic drug: estradiol   progesterone 100 mg capsule; Commonly known as: Prometrium       Outpatient Follow-Up  Future Appointments   Date Time Provider Department Center   4/15/2024 11:00 AM Jessica Ch, APRN-CNP [id number] [facility]       Brianna Pratt, APRN-CNP

## 2024-03-01 NOTE — NURSING NOTE

## 2024-03-01 NOTE — PROGRESS NOTES
"Ludmila Escamilla \"Lubna\" is a 68 y.o. female on day 4 of admission presenting with Incarcerated hernia.    Assessment/Plan   Status post bowel resection secondary to incarcerated hernia.  Diet is advanced to regular.  We can assess for discharge later on today.  Drain can be removed    Subjective   Doing well.  Starting to have some liquidy stools.       Objective     Physical Exam  NAD  A&Ox3  Non icteric  CTA  RR  Abdomen soft min tender. Wounds clean, intact  Extremities warm, well perfused     Last Recorded Vitals  Blood pressure 135/82, pulse 87, temperature 35.7 °C (96.3 °F), resp. rate 18, height 1.524 m (5'), weight 54.4 kg (120 lb), SpO2 97 %.  Intake/Output last 3 Shifts:  I/O last 3 completed shifts:  In: 920.8 (16.9 mL/kg) [P.O.:600; I.V.:120.8 (2.2 mL/kg); IV Piggyback:200]  Out: 145 (2.7 mL/kg) [Drains:145]  Weight: 54.4 kg     Relevant Results    Scheduled medications  acetaminophen, 650 mg, oral, q6h  enoxaparin, 40 mg, subcutaneous, q24h  furosemide, 20 mg, intravenous, Once  iron sucrose, 100 mg, intravenous, Daily  piperacillin-tazobactam, 3.375 g, intravenous, q6h      Continuous medications     PRN medications  PRN medications: HYDROmorphone, naloxone, ondansetron ODT **OR** ondansetron, oxyCODONE, oxyCODONE, oxygen    Results for orders placed or performed during the hospital encounter of 02/26/24 (from the past 24 hour(s))   Basic Metabolic Panel   Result Value Ref Range    Glucose 85 74 - 99 mg/dL    Sodium 139 136 - 145 mmol/L    Potassium 3.6 3.5 - 5.3 mmol/L    Chloride 102 98 - 107 mmol/L    Bicarbonate 29 21 - 32 mmol/L    Anion Gap 12 10 - 20 mmol/L    Urea Nitrogen 6 6 - 23 mg/dL    Creatinine 0.46 (L) 0.50 - 1.05 mg/dL    eGFR >90 >60 mL/min/1.73m*2    Calcium 8.0 (L) 8.6 - 10.3 mg/dL   CBC   Result Value Ref Range    WBC 5.8 4.4 - 11.3 x10*3/uL    nRBC 0.0 0.0 - 0.0 /100 WBCs    RBC 2.82 (L) 4.00 - 5.20 x10*6/uL    Hemoglobin 8.7 (L) 12.0 - 16.0 g/dL    Hematocrit 26.3 (L) 36.0 - " 46.0 %    MCV 93 80 - 100 fL    MCH 30.9 26.0 - 34.0 pg    MCHC 33.1 32.0 - 36.0 g/dL    RDW 13.2 11.5 - 14.5 %    Platelets 241 150 - 450 x10*3/uL           I spent 25 minutes in the professional and overall care of this patient.      Lloyd Berman MD

## 2024-03-01 NOTE — PROGRESS NOTES
03/01/24 1148   Discharge Planning   Patient expects to be discharged to: home     3/1/24 11:48 patient diet has been advanced, patient still w/o BM and has not been med cleared for discharge, will anticipate discharge has soon as patient has B/M Chantel ESTRADA RN

## 2024-03-13 ENCOUNTER — OFFICE VISIT (OUTPATIENT)
Dept: SURGERY | Facility: CLINIC | Age: 69
End: 2024-03-13
Payer: MEDICARE

## 2024-03-13 VITALS
HEART RATE: 93 BPM | TEMPERATURE: 97.4 F | DIASTOLIC BLOOD PRESSURE: 64 MMHG | BODY MASS INDEX: 23.56 KG/M2 | WEIGHT: 120 LBS | HEIGHT: 60 IN | SYSTOLIC BLOOD PRESSURE: 104 MMHG

## 2024-03-13 DIAGNOSIS — K40.30 INCARCERATED LEFT INGUINAL HERNIA: Primary | ICD-10-CM

## 2024-03-13 PROCEDURE — 1111F DSCHRG MED/CURRENT MED MERGE: CPT | Performed by: SURGERY

## 2024-03-13 PROCEDURE — 1159F MED LIST DOCD IN RCRD: CPT | Performed by: SURGERY

## 2024-03-13 PROCEDURE — 99214 OFFICE O/P EST MOD 30 MIN: CPT | Performed by: SURGERY

## 2024-03-13 PROCEDURE — 1126F AMNT PAIN NOTED NONE PRSNT: CPT | Performed by: SURGERY

## 2024-03-13 PROCEDURE — 1036F TOBACCO NON-USER: CPT | Performed by: SURGERY

## 2024-03-13 RX ORDER — AZELASTINE 1 MG/ML
2 SPRAY, METERED NASAL 2 TIMES DAILY
COMMUNITY
Start: 2022-09-07

## 2024-03-13 RX ORDER — CEFAZOLIN SODIUM 2 G/100ML
2 INJECTION, SOLUTION INTRAVENOUS ONCE
Status: CANCELLED | OUTPATIENT
Start: 2024-03-13 | End: 2024-03-13

## 2024-03-13 RX ORDER — FLUTICASONE PROPIONATE 50 MCG
1 SPRAY, SUSPENSION (ML) NASAL
COMMUNITY
Start: 2022-09-07

## 2024-03-13 RX ORDER — MULTIVIT-MIN/IRON/FOLIC ACID/K 18-600-40
CAPSULE ORAL
COMMUNITY
Start: 2022-09-07

## 2024-03-13 RX ORDER — SODIUM CHLORIDE, SODIUM LACTATE, POTASSIUM CHLORIDE, CALCIUM CHLORIDE 600; 310; 30; 20 MG/100ML; MG/100ML; MG/100ML; MG/100ML
100 INJECTION, SOLUTION INTRAVENOUS CONTINUOUS
Status: CANCELLED | OUTPATIENT
Start: 2024-03-13

## 2024-03-13 NOTE — LETTER
March 13, 2024     Tracee Mclean DO  [address] [facility] [address]    Patient: Lubna Escamilla   YOB: 1955   Date of Visit: 3/13/2024       Dear Dr. Tracee Mclean, DO:    Thank you for referring Lubna Escamilla to me for evaluation. Below are my notes for this consultation.  If you have questions, please do not hesitate to call me. I look forward to following your patient along with you.       Sincerely,     Lloyd Berman MD      CC: Argenis Kovacs MD  ______________________________________________________________________________________    History Of Present Illness  Lubna Escamilla is a 68 y.o. female presenting with follow-up after recent laparoscopic small bowel resection for what was felt to be a strangulated left inguinal hernia.  Dr. Hagan had done a small bowel resection and then just sort of plicated the redundant peritoneum.  She is doing very well.  Eating.  Not having any pain.  Bowels are working regularly.     Past Medical History  Past Medical History:   Diagnosis Date   • HTN (hypertension)    • Limb girdle muscular dystrophy, unspecified (CMS/HCC)        Surgical History  Past Surgical History:   Procedure Laterality Date   • COLONOSCOPY     • DILATION AND CURETTAGE OF UTERUS  06/27/2023   • HIP RESURFACING Bilateral     right 2011, left 2012   • LASIK     • TOTAL HIP ARTHROPLASTY Bilateral 2013    Conversion from hip resurfacing to total, right 6/2013, left 12/2013        Social History  She reports that she has never smoked. She has never used smokeless tobacco. She reports current alcohol use. She reports that she does not use drugs.    Family History  No family history on file.     Allergies  Succinylcholine and Pollen extracts    Review of Systems     Physical Exam  General: well appearing, no acute distress, alert and oriented x 3  Eyes: PERRLA, EOMI  HEENT: normal  Neck: supple  Pulmonary: lungs clear to auscultation bilaterally  CV: RR, S1S2, no  "murmurs  Abdomen: soft, non tender, no masses  Minimal soreness around umbilicus,  Wounds all nicely healed.  Small bulge left groin  MS: grossly normal  Neurologic: alert and oriented, strength/sensation intact  Skin: non jaundiced, no lesions    Last Recorded Vitals  Blood pressure 104/64, pulse 93, temperature 36.3 °C (97.4 °F), temperature source Temporal, height 1.524 m (5'), weight 54.4 kg (120 lb).    Relevant Results        Surgical Pathology Exam: [id number]  Order: [id number]  Collected 2/26/2024 23:05       Status: Final result       Visible to patient: Yes (not seen)       Dx: Free intraperitoneal air; Inguinal he...    0 Result Notes       Component  Resulting Agency   FINAL DIAGNOSIS   A. SMALL INTESTINE, SEGMENTAL RESECTION:      -- Segment of small intestine with ischemic type changes and hemorrhage   Electronically signed by Scot Deng MD on 2/29/2024 at 1612      [facility]   By the signature on this report, the individual or group listed as making the Final Interpretation/Diagnosis certifies that they have reviewed this case.    Clinical History  [facility]   Pre-op diagnosis:  Inguinal hernia of left side with obstruction [K40.30]  Free intraperitoneal air [K66.8]   Gross Description  [facility]   A. Received in formalin, labeled with the patient's name and hospital number and \"small bowel resection\", is a segment of small bowel, 4.5 x 4.2 x 2.3 cm. The outer surface is dusky red and smooth with a small segment of attached fibroadipose tissue. There is 1 staple line present, 5.2 cm in length. The mucosal surface is red-brown and hemorrhagic with adjacent pink-tan mucosa. No gross lesions are identified. Representative sections are submitted in 2 cassettes.  ESB     Summary of cassettes           Assessment/Plan      Patient is status post laparoscopic assisted small bowel resection for what was felt to be a strangulated left inguinal hernia.  She is here to discussed options moving forward in terms of preventing " this from happening again.  We discussed laparoscopic repair of a left inguinal hernia along with the attendant risks and benefits.  This can be reasonably scheduled sometime by the end of the month.       I spent 45 minutes in the professional and overall care of this patient.      Lloyd Berman MD

## 2024-03-13 NOTE — PROGRESS NOTES
History Of Present Illness  Lubna Escamilla is a 68 y.o. female presenting with follow-up after recent laparoscopic small bowel resection for what was felt to be a strangulated left inguinal hernia.  Dr. Hagan had done a small bowel resection and then just sort of plicated the redundant peritoneum.  She is doing very well.  Eating.  Not having any pain.  Bowels are working regularly.     Past Medical History  Past Medical History:   Diagnosis Date    HTN (hypertension)     Limb girdle muscular dystrophy, unspecified (CMS/HCC)        Surgical History  Past Surgical History:   Procedure Laterality Date    COLONOSCOPY      DILATION AND CURETTAGE OF UTERUS  06/27/2023    HIP RESURFACING Bilateral     right 2011, left 2012    LASIK      TOTAL HIP ARTHROPLASTY Bilateral 2013    Conversion from hip resurfacing to total, right 6/2013, left 12/2013        Social History  She reports that she has never smoked. She has never used smokeless tobacco. She reports current alcohol use. She reports that she does not use drugs.    Family History  No family history on file.     Allergies  Succinylcholine and Pollen extracts    Review of Systems     Physical Exam  General: well appearing, no acute distress, alert and oriented x 3  Eyes: PERRLA, EOMI  HEENT: normal  Neck: supple  Pulmonary: lungs clear to auscultation bilaterally  CV: RR, S1S2, no murmurs  Abdomen: soft, non tender, no masses  Minimal soreness around umbilicus,  Wounds all nicely healed.  Small bulge left groin  MS: grossly normal  Neurologic: alert and oriented, strength/sensation intact  Skin: non jaundiced, no lesions    Last Recorded Vitals  Blood pressure 104/64, pulse 93, temperature 36.3 °C (97.4 °F), temperature source Temporal, height 1.524 m (5'), weight 54.4 kg (120 lb).    Relevant Results        Surgical Pathology Exam: [id number]  Order: [id number]  Collected 2/26/2024 23:05       Status: Final result       Visible to patient: Yes (not seen)       Dx:  "Free intraperitoneal air; Inguinal he...    0 Result Notes       Component  Resulting Agency   FINAL DIAGNOSIS   A. SMALL INTESTINE, SEGMENTAL RESECTION:      -- Segment of small intestine with ischemic type changes and hemorrhage   Electronically signed by Scot Deng MD on 2/29/2024 at 1612      [facility]   By the signature on this report, the individual or group listed as making the Final Interpretation/Diagnosis certifies that they have reviewed this case.    Clinical History  [facility]   Pre-op diagnosis:  Inguinal hernia of left side with obstruction [K40.30]  Free intraperitoneal air [K66.8]   Gross Description  [facility]   A. Received in formalin, labeled with the patient's name and hospital number and \"small bowel resection\", is a segment of small bowel, 4.5 x 4.2 x 2.3 cm. The outer surface is dusky red and smooth with a small segment of attached fibroadipose tissue. There is 1 staple line present, 5.2 cm in length. The mucosal surface is red-brown and hemorrhagic with adjacent pink-tan mucosa. No gross lesions are identified. Representative sections are submitted in 2 cassettes.  ESB     Summary of cassettes           Assessment/Plan       Patient is status post laparoscopic assisted small bowel resection for what was felt to be a strangulated left inguinal hernia.  She is here to discussed options moving forward in terms of preventing this from happening again.  We discussed laparoscopic repair of a left inguinal hernia along with the attendant risks and benefits.  This can be reasonably scheduled sometime by the end of the month.       I spent 45 minutes in the professional and overall care of this patient.      Lloyd Berman MD    "

## 2024-04-01 ENCOUNTER — TELEPHONE (OUTPATIENT)
Dept: OBSTETRICS AND GYNECOLOGY | Facility: CLINIC | Age: 69
End: 2024-04-01
Payer: MEDICARE

## 2024-04-03 DIAGNOSIS — N95.1 MENOPAUSAL SYMPTOMS: Primary | ICD-10-CM

## 2024-04-03 RX ORDER — ESTRADIOL 0.03 MG/D
2 PATCH, EXTENDED RELEASE TRANSDERMAL 2 TIMES WEEKLY
Qty: 24 PATCH | Refills: 3 | Status: SHIPPED | OUTPATIENT
Start: 2024-04-04

## 2024-04-08 ENCOUNTER — APPOINTMENT (OUTPATIENT)
Dept: SURGERY | Facility: CLINIC | Age: 69
End: 2024-04-08
Payer: MEDICARE

## 2024-04-15 ENCOUNTER — APPOINTMENT (OUTPATIENT)
Dept: SURGERY | Facility: CLINIC | Age: 69
End: 2024-04-15
Payer: MEDICARE

## 2024-04-20 ENCOUNTER — ANESTHESIA EVENT (OUTPATIENT)
Dept: OPERATING ROOM | Facility: HOSPITAL | Age: 69
End: 2024-04-20
Payer: MEDICARE

## 2024-04-22 ENCOUNTER — HOSPITAL ENCOUNTER (OUTPATIENT)
Facility: HOSPITAL | Age: 69
Setting detail: OUTPATIENT SURGERY
Discharge: HOME | End: 2024-04-22
Attending: SURGERY | Admitting: SURGERY
Payer: MEDICARE

## 2024-04-22 ENCOUNTER — ANESTHESIA (OUTPATIENT)
Dept: OPERATING ROOM | Facility: HOSPITAL | Age: 69
End: 2024-04-22
Payer: MEDICARE

## 2024-04-22 VITALS
HEIGHT: 60 IN | SYSTOLIC BLOOD PRESSURE: 116 MMHG | RESPIRATION RATE: 15 BRPM | WEIGHT: 106.7 LBS | BODY MASS INDEX: 20.95 KG/M2 | DIASTOLIC BLOOD PRESSURE: 59 MMHG | HEART RATE: 74 BPM | OXYGEN SATURATION: 97 % | TEMPERATURE: 97.5 F

## 2024-04-22 DIAGNOSIS — K40.30 INCARCERATED LEFT INGUINAL HERNIA: Primary | ICD-10-CM

## 2024-04-22 PROBLEM — I10 HTN (HYPERTENSION): Status: ACTIVE | Noted: 2024-04-22

## 2024-04-22 PROBLEM — G70.9 NEUROMUSCULAR DISEASE (MULTI): Status: ACTIVE | Noted: 2024-04-22

## 2024-04-22 LAB
ABO GROUP (TYPE) IN BLOOD: NORMAL
ANTIBODY SCREEN: NORMAL
RH FACTOR (ANTIGEN D): NORMAL

## 2024-04-22 PROCEDURE — 2500000004 HC RX 250 GENERAL PHARMACY W/ HCPCS (ALT 636 FOR OP/ED): Performed by: NURSE ANESTHETIST, CERTIFIED REGISTERED

## 2024-04-22 PROCEDURE — 2500000004 HC RX 250 GENERAL PHARMACY W/ HCPCS (ALT 636 FOR OP/ED): Performed by: ANESTHESIOLOGY

## 2024-04-22 PROCEDURE — 3700000002 HC GENERAL ANESTHESIA TIME - EACH INCREMENTAL 1 MINUTE: Performed by: SURGERY

## 2024-04-22 PROCEDURE — 7100000009 HC PHASE TWO TIME - INITIAL BASE CHARGE: Performed by: SURGERY

## 2024-04-22 PROCEDURE — 7100000001 HC RECOVERY ROOM TIME - INITIAL BASE CHARGE: Performed by: SURGERY

## 2024-04-22 PROCEDURE — 3600000003 HC OR TIME - INITIAL BASE CHARGE - PROCEDURE LEVEL THREE: Performed by: SURGERY

## 2024-04-22 PROCEDURE — 3700000001 HC GENERAL ANESTHESIA TIME - INITIAL BASE CHARGE: Performed by: SURGERY

## 2024-04-22 PROCEDURE — 2500000001 HC RX 250 WO HCPCS SELF ADMINISTERED DRUGS (ALT 637 FOR MEDICARE OP): Performed by: ANESTHESIOLOGY

## 2024-04-22 PROCEDURE — 2780000003 HC OR 278 NO HCPCS: Performed by: SURGERY

## 2024-04-22 PROCEDURE — 2500000004 HC RX 250 GENERAL PHARMACY W/ HCPCS (ALT 636 FOR OP/ED): Performed by: SURGERY

## 2024-04-22 PROCEDURE — 2720000007 HC OR 272 NO HCPCS: Performed by: SURGERY

## 2024-04-22 PROCEDURE — 2500000005 HC RX 250 GENERAL PHARMACY W/O HCPCS: Performed by: NURSE ANESTHETIST, CERTIFIED REGISTERED

## 2024-04-22 PROCEDURE — A49553 PR REPAIR FEMORAL HERNIA,STRANG: Performed by: NURSE ANESTHETIST, CERTIFIED REGISTERED

## 2024-04-22 PROCEDURE — 7100000002 HC RECOVERY ROOM TIME - EACH INCREMENTAL 1 MINUTE: Performed by: SURGERY

## 2024-04-22 PROCEDURE — 86850 RBC ANTIBODY SCREEN: CPT | Performed by: SURGERY

## 2024-04-22 PROCEDURE — A49553 PR REPAIR FEMORAL HERNIA,STRANG: Performed by: ANESTHESIOLOGY

## 2024-04-22 PROCEDURE — 2500000005 HC RX 250 GENERAL PHARMACY W/O HCPCS: Performed by: SURGERY

## 2024-04-22 PROCEDURE — 2500000002 HC RX 250 W HCPCS SELF ADMINISTERED DRUGS (ALT 637 FOR MEDICARE OP, ALT 636 FOR OP/ED): Performed by: ANESTHESIOLOGY

## 2024-04-22 PROCEDURE — 7100000010 HC PHASE TWO TIME - EACH INCREMENTAL 1 MINUTE: Performed by: SURGERY

## 2024-04-22 PROCEDURE — C1781 MESH (IMPLANTABLE): HCPCS | Performed by: SURGERY

## 2024-04-22 PROCEDURE — 49659 UNLSTD LAPS PX HRNAP HRNRPHY: CPT | Performed by: SURGERY

## 2024-04-22 PROCEDURE — A4217 STERILE WATER/SALINE, 500 ML: HCPCS | Performed by: SURGERY

## 2024-04-22 PROCEDURE — 3600000008 HC OR TIME - EACH INCREMENTAL 1 MINUTE - PROCEDURE LEVEL THREE: Performed by: SURGERY

## 2024-04-22 PROCEDURE — 36415 COLL VENOUS BLD VENIPUNCTURE: CPT | Performed by: SURGERY

## 2024-04-22 DEVICE — 3DMAX™ MID ANATOMICAL MESH, LARGE, LEFT, 4" X 6", 10 X 16 CM
Type: IMPLANTABLE DEVICE | Site: ABDOMEN | Status: FUNCTIONAL
Brand: 3DMAX™ MID ANATOMICAL MESH

## 2024-04-22 RX ORDER — HYDRALAZINE HYDROCHLORIDE 20 MG/ML
5 INJECTION INTRAMUSCULAR; INTRAVENOUS EVERY 30 MIN PRN
Status: DISCONTINUED | OUTPATIENT
Start: 2024-04-22 | End: 2024-04-22 | Stop reason: HOSPADM

## 2024-04-22 RX ORDER — FENTANYL CITRATE 50 UG/ML
INJECTION, SOLUTION INTRAMUSCULAR; INTRAVENOUS AS NEEDED
Status: DISCONTINUED | OUTPATIENT
Start: 2024-04-22 | End: 2024-04-22

## 2024-04-22 RX ORDER — ONDANSETRON HYDROCHLORIDE 2 MG/ML
INJECTION, SOLUTION INTRAVENOUS AS NEEDED
Status: DISCONTINUED | OUTPATIENT
Start: 2024-04-22 | End: 2024-04-22

## 2024-04-22 RX ORDER — MIDAZOLAM HYDROCHLORIDE 1 MG/ML
INJECTION INTRAMUSCULAR; INTRAVENOUS AS NEEDED
Status: DISCONTINUED | OUTPATIENT
Start: 2024-04-22 | End: 2024-04-22

## 2024-04-22 RX ORDER — SODIUM CHLORIDE, SODIUM LACTATE, POTASSIUM CHLORIDE, CALCIUM CHLORIDE 600; 310; 30; 20 MG/100ML; MG/100ML; MG/100ML; MG/100ML
100 INJECTION, SOLUTION INTRAVENOUS CONTINUOUS
Status: DISCONTINUED | OUTPATIENT
Start: 2024-04-22 | End: 2024-04-22 | Stop reason: HOSPADM

## 2024-04-22 RX ORDER — DIPHENHYDRAMINE HYDROCHLORIDE 50 MG/ML
12.5 INJECTION INTRAMUSCULAR; INTRAVENOUS ONCE AS NEEDED
Status: DISCONTINUED | OUTPATIENT
Start: 2024-04-22 | End: 2024-04-22 | Stop reason: HOSPADM

## 2024-04-22 RX ORDER — FERROUS SULFATE 325(65) MG
325 TABLET ORAL
COMMUNITY

## 2024-04-22 RX ORDER — SCOLOPAMINE TRANSDERMAL SYSTEM 1 MG/1
1 PATCH, EXTENDED RELEASE TRANSDERMAL ONCE
Status: DISCONTINUED | OUTPATIENT
Start: 2024-04-22 | End: 2024-04-22 | Stop reason: HOSPADM

## 2024-04-22 RX ORDER — ALBUTEROL SULFATE 0.83 MG/ML
2.5 SOLUTION RESPIRATORY (INHALATION) ONCE AS NEEDED
Status: DISCONTINUED | OUTPATIENT
Start: 2024-04-22 | End: 2024-04-22 | Stop reason: HOSPADM

## 2024-04-22 RX ORDER — CEFAZOLIN SODIUM 2 G/100ML
2 INJECTION, SOLUTION INTRAVENOUS ONCE
Status: DISCONTINUED | OUTPATIENT
Start: 2024-04-22 | End: 2024-04-22 | Stop reason: HOSPADM

## 2024-04-22 RX ORDER — CEFAZOLIN 1 G/1
INJECTION, POWDER, FOR SOLUTION INTRAVENOUS AS NEEDED
Status: DISCONTINUED | OUTPATIENT
Start: 2024-04-22 | End: 2024-04-22

## 2024-04-22 RX ORDER — POLYETHYLENE GLYCOL 3350 17 G/17G
17 POWDER, FOR SOLUTION ORAL DAILY PRN
Qty: 10 PACKET | Refills: 0 | Status: SHIPPED | OUTPATIENT
Start: 2024-04-22

## 2024-04-22 RX ORDER — KETOROLAC TROMETHAMINE 30 MG/ML
INJECTION, SOLUTION INTRAMUSCULAR; INTRAVENOUS AS NEEDED
Status: DISCONTINUED | OUTPATIENT
Start: 2024-04-22 | End: 2024-04-22

## 2024-04-22 RX ORDER — CYANOCOBALAMIN (VITAMIN B-12) 250 MCG
250 TABLET ORAL DAILY
COMMUNITY

## 2024-04-22 RX ORDER — APREPITANT 40 MG/1
40 CAPSULE ORAL ONCE
Status: COMPLETED | OUTPATIENT
Start: 2024-04-22 | End: 2024-04-22

## 2024-04-22 RX ORDER — ONDANSETRON HYDROCHLORIDE 2 MG/ML
4 INJECTION, SOLUTION INTRAVENOUS ONCE AS NEEDED
Status: DISCONTINUED | OUTPATIENT
Start: 2024-04-22 | End: 2024-04-22 | Stop reason: HOSPADM

## 2024-04-22 RX ORDER — PHENYLEPHRINE HCL IN 0.9% NACL 1 MG/10 ML
SYRINGE (ML) INTRAVENOUS AS NEEDED
Status: DISCONTINUED | OUTPATIENT
Start: 2024-04-22 | End: 2024-04-22

## 2024-04-22 RX ORDER — ACETAMINOPHEN 325 MG/1
975 TABLET ORAL ONCE
Status: COMPLETED | OUTPATIENT
Start: 2024-04-22 | End: 2024-04-22

## 2024-04-22 RX ORDER — PROPOFOL 10 MG/ML
INJECTION, EMULSION INTRAVENOUS AS NEEDED
Status: DISCONTINUED | OUTPATIENT
Start: 2024-04-22 | End: 2024-04-22

## 2024-04-22 RX ORDER — IBUPROFEN 600 MG/1
600 TABLET ORAL EVERY 6 HOURS PRN
Qty: 20 TABLET | Refills: 0 | Status: SHIPPED | OUTPATIENT
Start: 2024-04-22

## 2024-04-22 RX ORDER — OXYCODONE HYDROCHLORIDE 5 MG/1
5 TABLET ORAL EVERY 4 HOURS PRN
Status: DISCONTINUED | OUTPATIENT
Start: 2024-04-22 | End: 2024-04-22 | Stop reason: HOSPADM

## 2024-04-22 RX ORDER — ROCURONIUM BROMIDE 10 MG/ML
INJECTION, SOLUTION INTRAVENOUS AS NEEDED
Status: DISCONTINUED | OUTPATIENT
Start: 2024-04-22 | End: 2024-04-22

## 2024-04-22 RX ORDER — PROMETHAZINE HYDROCHLORIDE 25 MG/ML
6.25 INJECTION, SOLUTION INTRAMUSCULAR; INTRAVENOUS ONCE AS NEEDED
Status: DISCONTINUED | OUTPATIENT
Start: 2024-04-22 | End: 2024-04-22 | Stop reason: HOSPADM

## 2024-04-22 RX ORDER — LIDOCAINE HYDROCHLORIDE 20 MG/ML
INJECTION, SOLUTION EPIDURAL; INFILTRATION; INTRACAUDAL; PERINEURAL AS NEEDED
Status: DISCONTINUED | OUTPATIENT
Start: 2024-04-22 | End: 2024-04-22

## 2024-04-22 RX ORDER — OXYCODONE AND ACETAMINOPHEN 5; 325 MG/1; MG/1
1 TABLET ORAL EVERY 6 HOURS PRN
Qty: 10 TABLET | Refills: 0 | Status: SHIPPED | OUTPATIENT
Start: 2024-04-22

## 2024-04-22 RX ORDER — SODIUM CHLORIDE 0.9 G/100ML
IRRIGANT IRRIGATION AS NEEDED
Status: DISCONTINUED | OUTPATIENT
Start: 2024-04-22 | End: 2024-04-22 | Stop reason: HOSPADM

## 2024-04-22 RX ORDER — BUPIVACAINE HYDROCHLORIDE 5 MG/ML
INJECTION, SOLUTION PERINEURAL AS NEEDED
Status: DISCONTINUED | OUTPATIENT
Start: 2024-04-22 | End: 2024-04-22 | Stop reason: HOSPADM

## 2024-04-22 RX ADMIN — KETOROLAC TROMETHAMINE 30 MG: 30 INJECTION, SOLUTION INTRAMUSCULAR at 08:35

## 2024-04-22 RX ADMIN — ACETAMINOPHEN 975 MG: 325 TABLET ORAL at 06:48

## 2024-04-22 RX ADMIN — CEFAZOLIN 2 G: 1 INJECTION, POWDER, FOR SOLUTION INTRAMUSCULAR; INTRAVENOUS at 07:40

## 2024-04-22 RX ADMIN — PROPOFOL 150 MG: 10 INJECTION, EMULSION INTRAVENOUS at 07:34

## 2024-04-22 RX ADMIN — Medication 100 MCG: at 07:37

## 2024-04-22 RX ADMIN — SODIUM CHLORIDE, POTASSIUM CHLORIDE, SODIUM LACTATE AND CALCIUM CHLORIDE 100 ML/HR: 600; 310; 30; 20 INJECTION, SOLUTION INTRAVENOUS at 06:48

## 2024-04-22 RX ADMIN — HYDROMORPHONE HYDROCHLORIDE 0.5 MG: 1 INJECTION, SOLUTION INTRAMUSCULAR; INTRAVENOUS; SUBCUTANEOUS at 08:50

## 2024-04-22 RX ADMIN — SCOPALAMINE 1 PATCH: 1 PATCH, EXTENDED RELEASE TRANSDERMAL at 06:49

## 2024-04-22 RX ADMIN — FENTANYL CITRATE 25 MCG: 50 INJECTION, SOLUTION INTRAMUSCULAR; INTRAVENOUS at 08:21

## 2024-04-22 RX ADMIN — SUGAMMADEX 200 MG: 100 INJECTION, SOLUTION INTRAVENOUS at 08:34

## 2024-04-22 RX ADMIN — ROCURONIUM BROMIDE 50 MG: 10 INJECTION INTRAVENOUS at 07:34

## 2024-04-22 RX ADMIN — MIDAZOLAM HYDROCHLORIDE 2 MG: 1 INJECTION, SOLUTION INTRAMUSCULAR; INTRAVENOUS at 07:29

## 2024-04-22 RX ADMIN — FENTANYL CITRATE 50 MCG: 50 INJECTION, SOLUTION INTRAMUSCULAR; INTRAVENOUS at 07:34

## 2024-04-22 RX ADMIN — LIDOCAINE HYDROCHLORIDE 70 MG: 20 INJECTION, SOLUTION EPIDURAL; INFILTRATION; INTRACAUDAL; PERINEURAL at 07:34

## 2024-04-22 RX ADMIN — APREPITANT 40 MG: 40 CAPSULE ORAL at 06:49

## 2024-04-22 RX ADMIN — ONDANSETRON 4 MG: 2 INJECTION INTRAMUSCULAR; INTRAVENOUS at 07:45

## 2024-04-22 RX ADMIN — OXYCODONE HYDROCHLORIDE 5 MG: 5 TABLET ORAL at 09:04

## 2024-04-22 RX ADMIN — HYDROMORPHONE HYDROCHLORIDE 0.5 MG: 1 INJECTION, SOLUTION INTRAMUSCULAR; INTRAVENOUS; SUBCUTANEOUS at 09:36

## 2024-04-22 RX ADMIN — FENTANYL CITRATE 25 MCG: 50 INJECTION, SOLUTION INTRAMUSCULAR; INTRAVENOUS at 08:39

## 2024-04-22 RX ADMIN — HYDROMORPHONE HYDROCHLORIDE 0.5 MG: 1 INJECTION, SOLUTION INTRAMUSCULAR; INTRAVENOUS; SUBCUTANEOUS at 08:55

## 2024-04-22 RX ADMIN — DEXAMETHASONE SODIUM PHOSPHATE 4 MG: 4 INJECTION, SOLUTION INTRAMUSCULAR; INTRAVENOUS at 07:45

## 2024-04-22 SDOH — HEALTH STABILITY: MENTAL HEALTH: CURRENT SMOKER: 0

## 2024-04-22 ASSESSMENT — PAIN SCALES - GENERAL
PAINLEVEL_OUTOF10: 4
PAINLEVEL_OUTOF10: 3
PAINLEVEL_OUTOF10: 0 - NO PAIN
PAINLEVEL_OUTOF10: 7
PAINLEVEL_OUTOF10: 0 - NO PAIN
PAINLEVEL_OUTOF10: 7
PAINLEVEL_OUTOF10: 4

## 2024-04-22 ASSESSMENT — COLUMBIA-SUICIDE SEVERITY RATING SCALE - C-SSRS
6. HAVE YOU EVER DONE ANYTHING, STARTED TO DO ANYTHING, OR PREPARED TO DO ANYTHING TO END YOUR LIFE?: NO
1. IN THE PAST MONTH, HAVE YOU WISHED YOU WERE DEAD OR WISHED YOU COULD GO TO SLEEP AND NOT WAKE UP?: NO
2. HAVE YOU ACTUALLY HAD ANY THOUGHTS OF KILLING YOURSELF?: NO

## 2024-04-22 NOTE — PERIOPERATIVE NURSING NOTE
844: Phase 1 care begins  850: 0.5 dilaudid given per emr order  853: Pt family updated via message  855: 0.5 dilaudid given per emr order  900: Pt tolerating fluids and apple sauce  904: 5 oxy given per emr order  919: Dr. Berman bedside speaking to pt  936: 0.5 dilaudid given per emr order  957: Pt family updated via voice mail  1010: Phase 2 care begins  1030: Discharge instructions reviewed with pt and , all questions answered at this time  1040: IV removed  1042: Pt transported to [facility]

## 2024-04-22 NOTE — OP NOTE
"Laparoscopic Left Femoral Space Hernia Repair; Mesh Placement (L) Operative Note     Date: 2024  OR Location: [facility] OR    Name: Ludmila Escamilla \"Lubna\", : 1955, Age: 68 y.o., MRN: [id number], Sex: female    Diagnosis  Pre-op Diagnosis     * Incarcerated left inguinal hernia [K40.30] Post-op Diagnosis     * Incarcerated left inguinal hernia [K40.30]     Procedures  Laparoscopic Left Femoral Space Hernia Repair; Mesh Placement  72536 - [place] LAPAROSCOPY SURG RPR INITIAL INGUINAL HERNIA      Surgeons      * Lloyd Berman - Primary    Resident/Fellow/Other Assistant:  Surgeons and Role:  * No surgeons found with a matching role *    Procedure Summary  Anesthesia: General  ASA: III  Anesthesia Staff: Anesthesiologist: Kelechi Parekh MD  CRNA: TRINH JohnsonCRNA; GIRISH Benedict  Estimated Blood Loss: 3mL  Intra-op Medications:   Administrations occurring from 0730 to 0900 on 24:   Medication Name Total Dose   BUPivacaine HCl (Marcaine) 0.5 % (5 mg/mL) injection 18 mL   sodium chloride 0.9 % irrigation solution 1,000 mL   HYDROmorphone (Dilaudid) injection 0.5 mg 1 mg   lactated Ringer's infusion Cannot be calculated              Anesthesia Record               Intraprocedure I/O Totals          Intake    lactated Ringer's infusion 800.00 mL    Total Intake 800 mL       Output    Est. Blood Loss 20 mL    Total Output 20 mL       Net    Net Volume 780 mL          Specimen: No specimens collected     Staff:   Circulator: Merry Shearer RN  Scrub Person: Ilene Eckert; José Antonio Chambers         Drains and/or Catheters:   [REMOVED] Urethral Catheter Non-latex (Removed)       Tourniquet Times:         Implants:  Implants       Type Name Action Serial No.      Surgical Mesh Sling Implant MESH, 3DMAX MID, 4 X 6 IN, LARGE LEFT - [id number] Implanted               Findings: Left femoral hernia    Indications: Lubna Escamilla is an 68 y.o. female who is having surgery for Incarcerated left " inguinal hernia [K40.30].  She had been treated for a strangulated left groin hernia several months ago with bowel resection.  Comes in today for definitive hernia repair.    The patient was seen in the preoperative area. The risks, benefits, complications, treatment options, non-operative alternatives, expected recovery and outcomes were discussed with the patient. The possibilities of reaction to medication, pulmonary aspiration, injury to surrounding structures, bleeding, recurrent infection, the need for additional procedures, failure to diagnose a condition, and creating a complication requiring transfusion or operation were discussed with the patient. The patient concurred with the proposed plan, giving informed consent.  The site of surgery was properly noted/marked if necessary per policy. The patient has been actively warmed in preoperative area. Preoperative antibiotics have been ordered and given within 1 hours of incision. Venous thrombosis prophylaxis have been ordered including bilateral sequential compression devices    Procedure Details: Patient was brought to the OR placed supine.  Timeout was performed confirm patient procedure and laterality.  Antibiotics were given general anesthesia ministered through endotracheal tube.  Mcfarland catheter was placed and the right arm was tucked.  We prepped and draped sterilely.  Injected Marcaine made supraumbilical incision with scalpel.  Fascia was incised we entered peritoneal cavity the balloon port and insufflated.  Placed 10 mm 30 degree camera.  2 right lateral 5 ports were placed.  Patient was put in Trendelenburg.  We opened up our peritoneal flap starting at the medial umbilical ligament and worked our way medial to lateral across the top of the myopectineal orifice with scissors and cautery.  Medially we exposed symphysis pubis and Warner's ligament.  Preperitoneal fat was reduced from the femoral defect.  Laterally exposed transversus abdominis and  psoas muscle.  Round ligament was divided between 5 mm clips.  Critical view was achieved of the myopectineal orifice.  I introduced the large 3D max patch rate at in the standard fashion and secured it inferomedially to Warner's ligament cephalad to the anterior abdominal wall with the OPTi fix device.  We then closed our peritoneal flap with running 3 0 V-Loc absorbable suture.  The needle was removed.  Ports were then taken out under direct visualization.  We desufflated and closed umbilical fascia with 0 Vicryl.  Skin incisions were closed with 4-0 Monocryl and Dermabond the patient to cover room satisfactory condition    Complications:  None; patient tolerated the procedure well.    Disposition: PACU - hemodynamically stable.  Condition: stable         Additional Details:     Attending Attestation: I was present for the entire procedure.    Lloyd Berman  Phone Number: [phone]

## 2024-04-22 NOTE — ANESTHESIA PROCEDURE NOTES
Airway  Date/Time: 4/22/2024 7:37 AM  Urgency: elective    Airway not difficult    Staffing  Performed: CRNA   Authorized by: Kelechi Parekh MD    Performed by: KELSEA Benedict-CONOR  Patient location during procedure: OR    Indications and Patient Condition  Indications for airway management: anesthesia and airway protection  Spontaneous Ventilation: absent  Sedation level: deep  Preoxygenated: yes  Patient position: sniffing  Mask difficulty assessment: 1 - vent by mask    Final Airway Details  Final airway type: endotracheal airway      Successful airway: ETT  Cuffed: yes   Successful intubation technique: direct laryngoscopy  Facilitating devices/methods: intubating stylet  Endotracheal tube insertion site: oral  Blade: Cyn  Blade size: #3  ETT size (mm): 7.0  Cormack-Lehane Classification: grade I - full view of glottis  Placement verified by: chest auscultation and capnometry   Cuff volume (mL): 5  Measured from: lips  ETT to lips (cm): 21  Number of attempts at approach: 1  Ventilation between attempts: BVM    Additional Comments  Smooth IV induction, easy mask ventilations, atraumatic DVL/intubation, teeth/lips intact.  #16 Fr OG atraumatically placed for minimal clear drainage

## 2024-04-22 NOTE — ANESTHESIA PREPROCEDURE EVALUATION
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 : N  Date of Last Liquid: 04/21/24  Time of Last Liquid: 2200  Date of Last Solid: 04/21/24  Time of Last Solid: 1900  Last Intake Type: Clear fluids (water)  Time of Last Void: 0620         Physical Exam    Airway  Mallampati: III  TM distance: >3 FB  Neck ROM: full     Cardiovascular    Dental    Pulmonary    Abdominal            Anesthesia Plan    History of general anesthesia?: yes  History of complications of general anesthesia?: yes    ASA 3     general     The patient is not a current smoker.    intravenous induction   Anesthetic plan and risks discussed with patient.    Plan discussed with CRNA and CAA.      "

## 2024-04-22 NOTE — BRIEF OP NOTE
"Date: 2024  OR Location: [facility] OR    Name: Ludmila Escamilla \"Caitlyn", : 1955, Age: 68 y.o., MRN: [id number], Sex: female    Diagnosis  Pre-op Diagnosis     * Incarcerated left inguinal hernia [K40.30] Post-op Diagnosis     * Incarcerated left inguinal hernia [K40.30]     Procedures  Laparoscopic Left Femoral Space Hernia Repair; Mesh Placement  70240 - [place] LAPAROSCOPY SURG RPR INITIAL INGUINAL HERNIA      Surgeons      * Lloyd Berman - Primary    Resident/Fellow/Other Assistant:  Surgeons and Role:  * No surgeons found with a matching role *    Procedure Summary  Anesthesia: General  ASA: III  Anesthesia Staff: Anesthesiologist: Kelechi Parekh MD  CRNA: GIRISH Johnson; GIRISH Benedict  Estimated Blood Loss: 5 mL  Intra-op Medications:   Administrations occurring from 0730 to 0900 on 24:   Medication Name Total Dose   BUPivacaine HCl (Marcaine) 0.5 % (5 mg/mL) injection 18 mL   sodium chloride 0.9 % irrigation solution 1,000 mL   lactated Ringer's infusion Cannot be calculated              Anesthesia Record               Intraprocedure I/O Totals          Intake    lactated Ringer's infusion 800.00 mL    Total Intake 800 mL       Output    Est. Blood Loss 20 mL    Total Output 20 mL       Net    Net Volume 780 mL          Specimen: No specimens collected     Staff:   Circulator: Merry Shearer RN  Scrub Person: Ilene Stephensr; José Antonio Chambers          Findings: Left femoral hernia containing adipose tissue, repaired with mesh     Complications:  None; patient tolerated the procedure well.     Disposition: PACU - hemodynamically stable.  Condition: stable  Specimens Collected: No specimens collected  Attending Attestation: I was present and scrubbed for the entire procedure.    Lloyd Berman  Phone Number: [phone]  "

## 2024-04-22 NOTE — ANESTHESIA POSTPROCEDURE EVALUATION
"Patient: Ludmila Escamilla \"Lubna\"    Procedure Summary       Date: 04/22/24 Room [facility]    Anesthesia Start: 0731 Anesthesia Stop: 0847    Procedure: Laparoscopic Left Femoral Space Hernia Repair; Mesh Placement (Left: Abdomen) Diagnosis:       Incarcerated left inguinal hernia      (Incarcerated left inguinal hernia [K40.30])    Surgeons: Lloyd Berman MD Responsible Provider: Kelechi Parekh MD    Anesthesia Type: general ASA Status: 3            Anesthesia Type: general    Vitals Value Taken Time   /69 04/22/24 0931   Temp 36.4 °C (97.5 °F) 04/22/24 0844   Pulse 72 04/22/24 0933   Resp 17 04/22/24 0930   SpO2 98 % 04/22/24 0933   Vitals shown include unfiled device data.    Anesthesia Post Evaluation    Patient location during evaluation: PACU  Patient participation: complete - patient participated  Level of consciousness: awake  Pain management: adequate  Multimodal analgesia pain management approach  Airway patency: patent  Cardiovascular status: acceptable  Respiratory status: acceptable  Hydration status: acceptable  Postoperative Nausea and Vomiting: none  Comments: Will continue to assess PONV status.        No notable events documented.    "

## 2024-04-22 NOTE — H&P
History Of Present Illness  Lubna Escamilla is a 68 y.o. female presenting with follow-up after recent laparoscopic small bowel resection for what was felt to be a strangulated left inguinal hernia.  Dr. Hagan had done a small bowel resection and then just sort of plicated the redundant peritoneum.  She is doing very well.  Eating.  Not having any pain.  Bowels are working regularly.     Past Medical History  Medical History        Past Medical History:   Diagnosis Date    HTN (hypertension)      Limb girdle muscular dystrophy, unspecified (CMS/HCC)              Surgical History  Surgical History         Past Surgical History:   Procedure Laterality Date    COLONOSCOPY        DILATION AND CURETTAGE OF UTERUS   06/27/2023    HIP RESURFACING Bilateral       right 2011, left 2012    LASIK        TOTAL HIP ARTHROPLASTY Bilateral 2013     Conversion from hip resurfacing to total, right 6/2013, left 12/2013            Social History  She reports that she has never smoked. She has never used smokeless tobacco. She reports current alcohol use. She reports that she does not use drugs.     Family History  Family History   No family history on file.        Allergies  Succinylcholine and Pollen extracts     Review of Systems     Physical Exam  General: well appearing, no acute distress, alert and oriented x 3  Eyes: PERRLA, EOMI  HEENT: normal  Neck: supple  Pulmonary: lungs clear to auscultation bilaterally  CV: RR, S1S2, no murmurs  Abdomen: soft, non tender, no masses  Minimal soreness around umbilicus,  Wounds all nicely healed.  Small bulge left groin  MS: grossly normal  Neurologic: alert and oriented, strength/sensation intact  Skin: non jaundiced, no lesions     Last Recorded Vitals  Blood pressure 104/64, pulse 93, temperature 36.3 °C (97.4 °F), temperature source Temporal, height 1.524 m (5'), weight 54.4 kg (120 lb).     Relevant Results           Surgical Pathology Exam: [id number]  Order: [id number]  Collected  "2/26/2024 23:05       Status: Final result       Visible to patient: Yes (not seen)       Dx: Free intraperitoneal air; Inguinal he...    0 Result Notes          Component   Resulting Agency   FINAL DIAGNOSIS   A. SMALL INTESTINE, SEGMENTAL RESECTION:      -- Segment of small intestine with ischemic type changes and hemorrhage   Electronically signed by Scot Deng MD on 2/29/2024 at 1612       [facility]   By the signature on this report, the individual or group listed as making the Final Interpretation/Diagnosis certifies that they have reviewed this case.    Clinical History   [facility]   Pre-op diagnosis:  Inguinal hernia of left side with obstruction [K40.30]  Free intraperitoneal air [K66.8]   Gross Description   [facility]   A. Received in formalin, labeled with the patient's name and hospital number and \"small bowel resection\", is a segment of small bowel, 4.5 x 4.2 x 2.3 cm. The outer surface is dusky red and smooth with a small segment of attached fibroadipose tissue. There is 1 staple line present, 5.2 cm in length. The mucosal surface is red-brown and hemorrhagic with adjacent pink-tan mucosa. No gross lesions are identified. Representative sections are submitted in 2 cassettes.  ESB     Summary of cassettes               Assessment/Plan   Patient is status post laparoscopic assisted small bowel resection for what was felt to be a strangulated left inguinal hernia.  She is here to discussed options moving forward in terms of preventing this from happening again.  We discussed laparoscopic repair of a left inguinal hernia along with the attendant risks and benefits.  This can be reasonably scheduled sometime by the end of the month.           I spent 45 minutes in the professional and overall care of this patient.        Lloyd Berman MD     "

## 2024-05-08 ENCOUNTER — OFFICE VISIT (OUTPATIENT)
Dept: SURGERY | Facility: CLINIC | Age: 69
End: 2024-05-08
Payer: MEDICARE

## 2024-05-08 VITALS
HEART RATE: 100 BPM | TEMPERATURE: 97.7 F | BODY MASS INDEX: 21.68 KG/M2 | HEIGHT: 60 IN | DIASTOLIC BLOOD PRESSURE: 71 MMHG | SYSTOLIC BLOOD PRESSURE: 107 MMHG | WEIGHT: 110.4 LBS

## 2024-05-08 DIAGNOSIS — Z09 SURGERY FOLLOW-UP: Primary | ICD-10-CM

## 2024-05-08 PROCEDURE — 1036F TOBACCO NON-USER: CPT | Performed by: SURGERY

## 2024-05-08 PROCEDURE — 1126F AMNT PAIN NOTED NONE PRSNT: CPT | Performed by: SURGERY

## 2024-05-08 PROCEDURE — 1159F MED LIST DOCD IN RCRD: CPT | Performed by: SURGERY

## 2024-05-08 PROCEDURE — 3074F SYST BP LT 130 MM HG: CPT | Performed by: SURGERY

## 2024-05-08 PROCEDURE — 99024 POSTOP FOLLOW-UP VISIT: CPT | Performed by: SURGERY

## 2024-05-08 PROCEDURE — 3078F DIAST BP <80 MM HG: CPT | Performed by: SURGERY

## 2024-05-08 ASSESSMENT — ENCOUNTER SYMPTOMS: DEPRESSION: 0

## 2024-05-08 ASSESSMENT — PAIN SCALES - GENERAL: PAINLEVEL: 0-NO PAIN

## 2024-05-08 NOTE — LETTER
May 8, 2024     Tracee Mclean DO  [address] [address]    Patient: Lubna Escamilla   YOB: 1955   Date of Visit: 5/8/2024       Dear Dr. Tracee Mclean, :    Thank you for referring Lubna Escamilla to me for evaluation. Below are my notes for this consultation.  If you have questions, please do not hesitate to call me. I look forward to following your patient along with you.       Sincerely,     Lloyd Berman MD      CC: No Recipients  ______________________________________________________________________________________    Assessment/Plan  She has made an excellent recovery following recent left femoral laparoscopic repair.  She can resume activities without specific limitations including water aerobics.  She can follow-up with me in 6 months or as needed    Subjective  Lubna is here to follow-up after recent laparoscopic repair of a left femoral hernia.  Doing well.  Minimal pain.  Just had some questions about activity        Objective    Physical Exam  NAD  A&Ox3  Non icteric  CTA  RR  Abdomen soft min tender. Wounds clean, intact.  No recurrent hernia  Extremities warm, well perfused         Relevant Results      No results found for this or any previous visit (from the past 24 hour(s)).        I spent 25 minutes in the professional and overall care of this patient.      Lloyd Berman MD

## 2024-05-08 NOTE — PROGRESS NOTES
Assessment/Plan   She has made an excellent recovery following recent left femoral laparoscopic repair.  She can resume activities without specific limitations including water aerobics.  She can follow-up with me in 6 months or as needed    Subjective   Lubna is here to follow-up after recent laparoscopic repair of a left femoral hernia.  Doing well.  Minimal pain.  Just had some questions about activity        Objective     Physical Exam  NAD  A&Ox3  Non icteric  CTA  RR  Abdomen soft min tender. Wounds clean, intact.  No recurrent hernia  Extremities warm, well perfused         Relevant Results      No results found for this or any previous visit (from the past 24 hour(s)).        I spent 25 minutes in the professional and overall care of this patient.      Lloyd Berman MD

## 2024-08-22 ENCOUNTER — TELEPHONE (OUTPATIENT)
Dept: OBSTETRICS AND GYNECOLOGY | Facility: CLINIC | Age: 69
End: 2024-08-22
Payer: MEDICARE

## 2024-08-22 DIAGNOSIS — B37.9 YEAST INFECTION: ICD-10-CM

## 2024-08-22 RX ORDER — FLUCONAZOLE 150 MG/1
150 TABLET ORAL ONCE
Qty: 1 TABLET | Refills: 0 | Status: SHIPPED | OUTPATIENT
Start: 2024-08-22 | End: 2024-08-22

## 2024-08-22 NOTE — TELEPHONE ENCOUNTER
Patient states she may have a yeast infection but hasnt been tested. She taken Monistat twice and she hasnt been relieved of her symptoms. I advised patient she is due for annual come September but feels the first available is too far out to wait to be treated. She would like a prescription to treat yeast infection without being tested.  [facility] [phone]

## 2024-08-22 NOTE — TELEPHONE ENCOUNTER
Called patient to discuss yeast infection symptoms  Identified by name and   Taken monistat twice now and no relief  Discharge, itchiness and pain noted  Scheduled for annual in November  Informed patient RN will send over rx to provider to review and sign if appropriate  Patient verbalized understanding, all questions/concerns addressed at this time.    LATONYA SarkarN RN'

## 2024-11-06 ENCOUNTER — APPOINTMENT (OUTPATIENT)
Dept: OBSTETRICS AND GYNECOLOGY | Facility: CLINIC | Age: 69
End: 2024-11-06
Payer: MEDICARE

## 2024-11-06 VITALS
SYSTOLIC BLOOD PRESSURE: 120 MMHG | HEIGHT: 60 IN | DIASTOLIC BLOOD PRESSURE: 66 MMHG | WEIGHT: 107 LBS | BODY MASS INDEX: 21.01 KG/M2

## 2024-11-06 DIAGNOSIS — Z01.419 WELL WOMAN EXAM: Primary | ICD-10-CM

## 2024-11-06 DIAGNOSIS — N95.1 MENOPAUSAL SYMPTOMS: ICD-10-CM

## 2024-11-06 DIAGNOSIS — Z12.31 BREAST CANCER SCREENING BY MAMMOGRAM: ICD-10-CM

## 2024-11-06 PROCEDURE — 1126F AMNT PAIN NOTED NONE PRSNT: CPT | Performed by: OBSTETRICS & GYNECOLOGY

## 2024-11-06 PROCEDURE — 1159F MED LIST DOCD IN RCRD: CPT | Performed by: OBSTETRICS & GYNECOLOGY

## 2024-11-06 PROCEDURE — 1160F RVW MEDS BY RX/DR IN RCRD: CPT | Performed by: OBSTETRICS & GYNECOLOGY

## 2024-11-06 PROCEDURE — 3008F BODY MASS INDEX DOCD: CPT | Performed by: OBSTETRICS & GYNECOLOGY

## 2024-11-06 PROCEDURE — G0101 CA SCREEN;PELVIC/BREAST EXAM: HCPCS | Performed by: OBSTETRICS & GYNECOLOGY

## 2024-11-06 PROCEDURE — 1036F TOBACCO NON-USER: CPT | Performed by: OBSTETRICS & GYNECOLOGY

## 2024-11-06 PROCEDURE — 3078F DIAST BP <80 MM HG: CPT | Performed by: OBSTETRICS & GYNECOLOGY

## 2024-11-06 PROCEDURE — 3074F SYST BP LT 130 MM HG: CPT | Performed by: OBSTETRICS & GYNECOLOGY

## 2024-11-06 RX ORDER — ESTRADIOL 0.03 MG/D
2 PATCH, EXTENDED RELEASE TRANSDERMAL 2 TIMES WEEKLY
Qty: 24 PATCH | Refills: 3 | Status: SHIPPED | OUTPATIENT
Start: 2024-11-07

## 2024-11-06 RX ORDER — PROGESTERONE 100 MG/1
100 CAPSULE ORAL DAILY
Qty: 90 CAPSULE | Refills: 3 | Status: SHIPPED | OUTPATIENT
Start: 2024-11-06

## 2024-11-06 ASSESSMENT — PAIN SCALES - GENERAL: PAINLEVEL_OUTOF10: 0-NO PAIN

## 2024-11-06 ASSESSMENT — ENCOUNTER SYMPTOMS
HEMATURIA: 0
UNEXPECTED WEIGHT CHANGE: 0
FATIGUE: 0
FREQUENCY: 0
ABDOMINAL DISTENTION: 0
BLOOD IN STOOL: 0
SLEEP DISTURBANCE: 0
NAUSEA: 0
DYSURIA: 0
VOMITING: 0
CONSTIPATION: 0
SHORTNESS OF BREATH: 0
CHILLS: 0
ABDOMINAL PAIN: 0
BACK PAIN: 0
FLANK PAIN: 0
DIARRHEA: 0
COLOR CHANGE: 0
APPETITE CHANGE: 0
FEVER: 0

## 2024-11-06 NOTE — PROGRESS NOTES
"History Of Present Illness  Routine Gyn Exam  Ludmila Escamilla \"Lubna\" here for routine WWE.  Pt is postmenopausal.  Denies spotting or bleeding.     Concerns: none.     Medical and surgical histories reviewed with patient.   Hernia repair since last visit.  (Strangulated left inguinal hernia)     Gynecologic History  Postmenopausal.  Sexually active: Yes with one male partner/ .  Last Pap: 2022.   Last mammogram: 1/2024.       Obstetric History  OB History   No obstetric history on file.        Review of Systems   Constitutional:  Negative for appetite change, chills, fatigue, fever and unexpected weight change.   Respiratory:  Negative for shortness of breath.    Cardiovascular:  Negative for chest pain.   Gastrointestinal:  Negative for abdominal distention, abdominal pain, blood in stool, constipation, diarrhea, nausea and vomiting.   Endocrine: Negative for cold intolerance and heat intolerance.   Genitourinary:  Negative for dyspareunia, dysuria, flank pain, frequency, genital sores, hematuria, menstrual problem, pelvic pain, urgency, vaginal bleeding, vaginal discharge and vaginal pain.   Musculoskeletal:  Negative for back pain.   Skin:  Negative for color change.   Psychiatric/Behavioral:  Negative for sleep disturbance.        /66 (BP Location: Right arm, Patient Position: Sitting)   Ht 1.524 m (5')   Wt 48.5 kg (107 lb)   BMI 20.90 kg/m²      Physical Exam  Constitutional:       Appearance: Normal appearance.   HENT:      Head: Normocephalic and atraumatic.   Chest:   Breasts:     Right: Normal.      Left: Normal.   Abdominal:      General: Abdomen is flat.      Palpations: Abdomen is soft.      Tenderness: There is no abdominal tenderness.   Genitourinary:     General: Normal vulva.      Vagina: Normal.      Cervix: Normal.      Uterus: Normal.       Adnexa: Right adnexa normal and left adnexa normal.   Skin:     General: Skin is warm and dry.   Neurological:      Mental Status: She is " alert and oriented to person, place, and time.   Psychiatric:         Mood and Affect: Mood normal.              Assessment/Plan         Routine Well Woman Exam Today  Discussed diet and exercise.   Reviewed routine health screenings.   Pap: 2022 wnl   Recommend annual mammograms. Mammogram due 1/2024, order placed.     Menopause  Pt currently on HRT. Would like to continue. We discussed increased risks of medication after 10+ years of use including blood clot, stroke, CV event and breast cancer risk.  Pt expressed understanding and may consider discontinuing in the next few years.                Lorie Coffey MD

## 2024-11-07 DIAGNOSIS — N95.1 MENOPAUSAL SYMPTOMS: Primary | ICD-10-CM

## 2024-11-07 RX ORDER — ESTRADIOL 0.03 MG/D
1 FILM, EXTENDED RELEASE TRANSDERMAL 2 TIMES WEEKLY
Qty: 24 PATCH | Refills: 3 | Status: SHIPPED | OUTPATIENT
Start: 2024-11-07 | End: 2025-11-07

## 2025-01-13 ENCOUNTER — HOSPITAL ENCOUNTER (OUTPATIENT)
Dept: RADIOLOGY | Facility: CLINIC | Age: 70
Discharge: HOME | End: 2025-01-13
Payer: MEDICARE

## 2025-01-13 VITALS — BODY MASS INDEX: 20.99 KG/M2 | HEIGHT: 60 IN | WEIGHT: 106.92 LBS

## 2025-01-13 DIAGNOSIS — Z12.31 BREAST CANCER SCREENING BY MAMMOGRAM: ICD-10-CM

## 2025-01-13 PROCEDURE — 77067 SCR MAMMO BI INCL CAD: CPT | Performed by: RADIOLOGY

## 2025-01-13 PROCEDURE — 77067 SCR MAMMO BI INCL CAD: CPT

## 2025-01-13 PROCEDURE — 77063 BREAST TOMOSYNTHESIS BI: CPT | Performed by: RADIOLOGY

## 2025-01-17 DIAGNOSIS — R92.1 BREAST CALCIFICATION, LEFT: Primary | ICD-10-CM

## 2025-01-22 ENCOUNTER — HOSPITAL ENCOUNTER (OUTPATIENT)
Dept: RADIOLOGY | Facility: CLINIC | Age: 70
Discharge: HOME | End: 2025-01-22
Payer: MEDICARE

## 2025-01-22 DIAGNOSIS — R92.1 BREAST CALCIFICATION, LEFT: ICD-10-CM

## 2025-01-22 PROCEDURE — 77065 DX MAMMO INCL CAD UNI: CPT | Mod: LT

## 2025-01-22 PROCEDURE — 77065 DX MAMMO INCL CAD UNI: CPT | Mod: LEFT SIDE | Performed by: STUDENT IN AN ORGANIZED HEALTH CARE EDUCATION/TRAINING PROGRAM

## 2025-02-03 PROBLEM — R92.8 ABNORMAL FINDING ON BREAST IMAGING: Status: ACTIVE | Noted: 2025-02-03

## 2025-02-03 NOTE — PROGRESS NOTES
"[facility]  Ludmila Escamilla female   1955 69 y.o.  [id number]      Chief Complaint  New patient, biopsy consultation.    History Of Present Illness  Ludmila Escamilla \"Lubna\" is a very pleasant 69 y.o. female seen in the breast center for biopsy consultation. She denies breast surgery or biopsy. She has no family history of breast cancer.     BREAST IMAGIN2025 Left diagnostic mammogram, indicates BI-RADS Category 4. There are 2 separate groups of calcifications in the left breast as described. Further evaluation with surgical consultation and stereotactic-guided biopsy is recommended.     REPRODUCTIVE HISTORY: menarche age 14, , first birth age 27, did not breastfeed, OCP's x 10-20 years, natural menopause age 56, HRT x 12 years, scattered fibroglandular tissue    FAMILY CANCER HISTORY:   none    Review of Systems  Constitutional:  Negative for appetite change, fatigue, fever and unexpected weight change.   HENT:  Negative for ear pain, hearing loss, nosebleeds, sore throat and trouble swallowing.    Eyes:  Negative for discharge, itching and visual disturbance.   Breast: As stated in HPI.  Respiratory:  Negative for cough, chest tightness and shortness of breath.    Cardiovascular:  Negative for chest pain, palpitations and leg swelling.   Gastrointestinal:  Negative for abdominal pain, constipation, diarrhea and nausea.   Endocrine: Negative for cold intolerance and heat intolerance.   Genitourinary:  Negative for dysuria, frequency, hematuria, pelvic pain and vaginal bleeding.   Musculoskeletal:  Negative for arthralgias, back pain, gait problem, joint swelling and myalgias.   Skin:  Negative for color change and rash.   Allergic/Immunologic: Negative for environmental allergies and food allergies.   Neurological:  Negative for dizziness, tremors, speech difficulty, weakness, numbness and headaches.   Hematological:  Does not bruise/bleed easily.   Psychiatric/Behavioral:  " Negative for agitation, dysphoric mood and sleep disturbance. The patient is not nervous/anxious.       Past Medical History  She has a past medical history of HTN (hypertension) and Limb girdle muscular dystrophy, unspecified (Multi).    She has no past medical history of Personal history of irradiation.    Surgical History  She has a past surgical history that includes Dilation and curettage of uterus (06/27/2023); Total hip arthroplasty (Bilateral, 2013); Hip resurfacing (Bilateral); Colonoscopy; and LASIK.    Family History  Cancer-related family history is not on file.     Social History  She reports that she has never smoked. She has never been exposed to tobacco smoke. She has never used smokeless tobacco. She reports current alcohol use of about 2.0 standard drinks of alcohol per week. She reports that she does not use drugs.    Allergies  Succinylcholine and Pollen extracts    Medications  Current Outpatient Medications   Medication Instructions    amLODIPine-benazepriL (Lotrel) 5-10 mg capsule 1 capsule, Daily RT    ascorbic acid, vitamin C, 500 mg capsule Take by mouth.    azelastine (Astelin) 137 mcg (0.1 %) nasal spray 2 sprays, 2 times daily    cyanocobalamin (VITAMIN B-12) 250 mcg, oral, Daily    DIETARY SUPPLEMENT ORAL 1 Application, oral, Once, Vital reds    fluticasone (Flonase) 50 mcg/actuation nasal spray 1 spray, Does not apply, Daily RT       Last Recorded Vitals  Vitals:    02/05/25 1300   BP: 123/75   Pulse: 84       Physical Exam  Patient is alert and oriented x3 and in a relaxed and appropriate mood. Her gait is steady and hand grasps are equal. Sclera is clear. The breasts are nearly symmetrical. The tissue is soft without palpable abnormalities, discrete nodules or masses. The skin and nipples appear normal. There is no cervical, supraclavicular or axillary lymphadenopathy.        Relevant Results and Imaging  BI mammo bilateral screening tomosynthesis 01/13/2025    Narrative  Interpreted  By:  Kate Valentine,  STUDY:  BI MAMMO BILATERAL SCREENING TOMOSYNTHESIS;  1/13/2025 2:04 pm    ACCESSION NUMBER(S):  [id number]    ORDERING CLINICIAN:  NIXON SHELDON    INDICATION:  Screening.    ,Z12.31 Encounter for screening mammogram for malignant neoplasm of  breast    COMPARISON:  01/04/2024, 10/03/2022, 05/18/2021, 02/04/2020, 08/13/2018, 03/28/2017    FINDINGS:  2D and tomosynthesis images were reviewed at 1 mm slice thickness.    Density:  There are scattered areas of fibroglandular density.    There are calcifications in the deep upper inner left breast and in  the upper outer right breast at middle depth. No suspicious masses or  calcifications are identified in the right breast.    Impression  No mammographic evidence of malignancy in the right breast. Left  breast calcifications. Additional views are recommended.    BI-RADS CATEGORY:  BI-RADS Category:  0 Incomplete; Need Additional Imaging Evaluation  Recommendation:  Additional Imaging.  Recommended Date:  Immediate.  Laterality:  Left.        For any future breast imaging appointments, please call [phone]  ([phone]).      MACRO:  None    Signed by: Kate Valentine 1/16/2025 12:46 PM  Dictation workstation:   [id number]    Study Result    Narrative & Impression   Interpreted By:  Jet Cartwright,   STUDY:  BI MAMMO LEFT DIAGNOSTIC;  1/22/2025 1:25 pm      ACCESSION NUMBER(S):  [id number]      ORDERING CLINICIAN:  NIXON SHELDON      INDICATION:  The patient was recalled from screening mammogram on 01/13/2025 for  left breast calcifications.      ,R92.1 Mammographic calcification found on diagnostic imaging of  breast      COMPARISON:  01/13/2025 and 01/04/2024      FINDINGS:  MAMMOGRAPHY: 2D and tomosynthesis images were reviewed at 1 mm slice  thickness.      Density:  There are scattered areas of fibroglandular density.      Coarse heterogeneous calcifications are seen in the superior central  left breast at middle depth. Linear  branching calcifications are seen  in the central medial left breast at posterior depth.      IMPRESSION:  There are 2 separate groups of calcifications in the left breast as  described. Further evaluation with surgical consultation and  stereotactic-guided biopsy is recommended. Dr. Jte Cartwright  explained the findings and recommendations to the patient at the time  of exam. A message was sent to the referring practitioner at the time  of this dictation regarding these findings using the epic critical  findings reporting system. A pre-procedure form was filled out.      Method of Detection: Category Sdbt - 3D Screening      BI-RADS CATEGORY:      BI-RADS Category:  4 Suspicious.  Recommendation:  Surgical Consultation and Biopsy.  Recommended Date:  Immediate.  Laterality:  Left.      For any future breast imaging appointments, please call [phone]  ([phone]).          MACRO:  None          Signed by: Jet Cartwright 1/22/2025 2:03 PM  Dictation workstation:   [id number]     I explained the results in depth, along with suggested explanation for follow up recommendations based on the testing results. BI-RADS Category 4    Visit Diagnosis  1. Abnormal finding on breast imaging            Assessment  Abnormal mammogram, left breast calcifications, scattered fibroglandular tissue    Plan  Left stereotactic guided core biopsy x 2 spots.    Patient Discussion/Summary  Proceed to biopsy. A breast radiology physician will perform the biopsy. Results are usually available in about 7-10 business days. I will call patient with results and instruct on next steps and plan.     IMPORTANT INFORMATION REGARDING YOUR RESULTS    If you receive medical information from My [facility] Personal Health Record (online chart) your results will be released into your chart. This means you may view or see results of your biopsy or procedure before I contact you directly. If this occurs, please call the office and we will discuss your  results over the phone.    You can see your health information, review clinical summaries from office visits & test results online when you follow your health with MY  Chart, a personal health record. To sign up go to [email or web address]. If you need assistance with signing up or trouble getting into your account call [facility] Patient Line 24/7 at [phone].    My office phone number is [phone]  if you need to get in touch with me or have additional questions or concerns. Thank you for choosing [facility] and trusting me as your healthcare provider. I look forward to seeing you again at your next office visit. I am honored to be a provider on your health care team and I remain dedicated to helping you achieve your health goals.       KELSEA Atkins-CNP

## 2025-02-05 ENCOUNTER — HOSPITAL ENCOUNTER (OUTPATIENT)
Dept: RADIOLOGY | Facility: CLINIC | Age: 70
Discharge: HOME | End: 2025-02-05
Payer: MEDICARE

## 2025-02-05 ENCOUNTER — APPOINTMENT (OUTPATIENT)
Dept: RADIOLOGY | Facility: CLINIC | Age: 70
End: 2025-02-05
Payer: MEDICARE

## 2025-02-05 ENCOUNTER — OFFICE VISIT (OUTPATIENT)
Dept: SURGICAL ONCOLOGY | Facility: CLINIC | Age: 70
End: 2025-02-05
Payer: MEDICARE

## 2025-02-05 VITALS
SYSTOLIC BLOOD PRESSURE: 123 MMHG | BODY MASS INDEX: 20.7 KG/M2 | HEART RATE: 84 BPM | WEIGHT: 106 LBS | DIASTOLIC BLOOD PRESSURE: 75 MMHG

## 2025-02-05 DIAGNOSIS — R92.8 OTHER ABNORMAL AND INCONCLUSIVE FINDINGS ON DIAGNOSTIC IMAGING OF BREAST: ICD-10-CM

## 2025-02-05 DIAGNOSIS — R92.1 BREAST CALCIFICATION, LEFT: ICD-10-CM

## 2025-02-05 DIAGNOSIS — R92.8 ABNORMAL FINDING ON BREAST IMAGING: Primary | ICD-10-CM

## 2025-02-05 PROCEDURE — 99204 OFFICE O/P NEW MOD 45 MIN: CPT | Performed by: NURSE PRACTITIONER

## 2025-02-05 PROCEDURE — A4648 IMPLANTABLE TISSUE MARKER: HCPCS

## 2025-02-05 PROCEDURE — 19081 BX BREAST 1ST LESION STRTCTC: CPT | Mod: LT

## 2025-02-05 PROCEDURE — 99214 OFFICE O/P EST MOD 30 MIN: CPT | Performed by: NURSE PRACTITIONER

## 2025-02-05 PROCEDURE — 19081 BX BREAST 1ST LESION STRTCTC: CPT | Mod: LEFT SIDE | Performed by: RADIOLOGY

## 2025-02-05 PROCEDURE — 19082 BX BREAST ADD LESION STRTCTC: CPT | Mod: LT

## 2025-02-05 PROCEDURE — 2780000003 HC OR 278 NO HCPCS

## 2025-02-05 PROCEDURE — 2720000007 HC OR 272 NO HCPCS

## 2025-02-05 PROCEDURE — 1126F AMNT PAIN NOTED NONE PRSNT: CPT | Performed by: NURSE PRACTITIONER

## 2025-02-05 PROCEDURE — 3078F DIAST BP <80 MM HG: CPT | Performed by: NURSE PRACTITIONER

## 2025-02-05 PROCEDURE — 3074F SYST BP LT 130 MM HG: CPT | Performed by: NURSE PRACTITIONER

## 2025-02-05 PROCEDURE — 77065 DX MAMMO INCL CAD UNI: CPT | Mod: LEFT SIDE | Performed by: RADIOLOGY

## 2025-02-05 PROCEDURE — 2500000004 HC RX 250 GENERAL PHARMACY W/ HCPCS (ALT 636 FOR OP/ED): Performed by: RADIOLOGY

## 2025-02-05 PROCEDURE — 19082 BX BREAST ADD LESION STRTCTC: CPT | Mod: LEFT SIDE | Performed by: RADIOLOGY

## 2025-02-05 RX ADMIN — Medication 20 ML: at 14:48

## 2025-02-05 ASSESSMENT — PAIN SCALES - GENERAL
PAINLEVEL_OUTOF10: 0 - NO PAIN
PAINLEVEL_OUTOF10: 0 - NO PAIN
PAINLEVEL_OUTOF10: 0-NO PAIN

## 2025-02-05 NOTE — Clinical Note
Procedure done.  Pressure held x 10 minutes, incision dry, steri strips intact and compression dressing applied. Ice pack applied.

## 2025-02-05 NOTE — DISCHARGE INSTRUCTIONS
AFTER THE TEST  A steri-strip and bandage will be placed over the incision. You may shower after 24 hours. Remove bandage after 24 hours. Remove bandage after the shower. Leave the steri-strips in place to fall off on their own. If after 1 week the steri-strips are still on, you may remove them. Avoid swimming or soaking in tub for 3 days.     You may have mild discomfort at the test site. If needed, you may take Tylenol (Acetaminophen) for pain. Please avoid taking NSAIDs, Motrin, Advil, Aleve, or ibuprofen for 24 hours following the biopsy. After 24 hours you may resume NSAIDSs.     If you take aspirin, Plavix, Coumadin, Xarelto or Eliquis please tell us. If these medications were stopped by your provider, please ask them when to resume.     You may have some tenderness, bruising or slight bleeding at the site. Please apply ice packs to the site for 15 minutes on and 15 minutes off for a 2 hour minimum.     Most people can return to their usual routine after the procedure. Avoid Strenuous activity for 24 hours.     Sleep in a bra the night after your biopsy. Continue to do so for comfort.     Call your provider if you have any of the following symptoms :  Fever  Increased pain  Increased bleeding  Redness  Increased swelling  Yellowish drainage  Your provider will get the biopsy results within 5 - 7 days. Call your provider with any questions.     Patient education brochure and pain/comfort measures have been reviewed.   Phone number provided to contact Breast Center if problems arise.     Patient verbalized understanding of home going instructions.  Patient left at 1540.

## 2025-02-05 NOTE — Clinical Note
Procedural steps explained and patient given opportunity to verbalize concerns and seek clarification.  Post procedure self-care and potential for bruising , hematoma, and pain reviewed.  Patient verbalizes understanding.    Patient offered aromatherapy, warm blankets and music. Guided imagery, touch and relaxation breathing to be used throughout the procedure.

## 2025-02-06 ENCOUNTER — TELEPHONE (OUTPATIENT)
Dept: RADIOLOGY | Facility: CLINIC | Age: 70
End: 2025-02-06
Payer: MEDICARE

## 2025-02-13 LAB
LAB AP ASR DISCLAIMER: NORMAL
LABORATORY COMMENT REPORT: NORMAL
PATH REPORT.FINAL DX SPEC: NORMAL
PATH REPORT.GROSS SPEC: NORMAL
PATH REPORT.RELEVANT HX SPEC: NORMAL
PATH REPORT.TOTAL CANCER: NORMAL

## 2025-02-14 ENCOUNTER — TELEPHONE (OUTPATIENT)
Dept: SURGICAL ONCOLOGY | Facility: HOSPITAL | Age: 70
End: 2025-02-14
Payer: MEDICARE

## 2025-02-14 NOTE — TELEPHONE ENCOUNTER
Result Communication    Spoke with Ludmila Escamilla regarding breast biopsy results showing atypical ductal hyperplasia. Surgical consultation for excision recommended and office will call to schedule.      Resulted Orders   Surgical Pathology Exam   Result Value Ref Range    Case Report       Surgical Pathology                                Case: [id number]                                  Authorizing Provider:  PAULA Atkins    Collected:           02/05/2025 1425              Ordering Location:     [facility]       Received:            02/05/2025 1545                                     Center                                                                       Pathologist:           Tommy Ulrich,                                                          Specimens:   A) - BREAST CORE BIOPSY LEFT, Left Breast Central Medial Calcifications                             B) - BREAST CORE BIOPSY LEFT, Left Breast Upper Outer Calcifications                       FINAL DIAGNOSIS       A. Left breast calcifications at central medial, stereotactic-guided, vacuum-assisted core biopsy:   -- Atypical ductal hyperplasia with associated microcalcifications.  -- Atypical lobular hyperplasia, see note.    Note: Immunohistochemical stain for e-cadherin shows reduced/absent expression supporting lobular phenotype. Multiple deeper levels were reviewed.     B. Left breast calcifications at upper outer, stereotactic-guided, vacuum-assisted core biopsy:   -- Atypical ductal hyperplasia in part involving fragments of an intraductal papilloma with associated calcifications.  -- Atypical lobular hyperplasia, see note.     Note: Immunohistochemical stain for e-cadherin shows focal reduced/absent expression supporting lobular phenotype. Multiple deeper levels were reviewed.     peb              By the signature on this report, the individual or group listed as making the Final Interpretation/Diagnosis certifies  "that they have reviewed this case.       Clinical History       Left Breast Stereotactic Vacuum Assisted Core Biopsy - Left Breast Central Medial Calcifications      Gross Description       A: Received in formalin, labeled with the patient's name and hospital number and \"left breast calcifications A\", are multiple irregular/cylindrical segments of yellow-white fatty soft tissue aggregating to 4.0 x 3.1 x 0.7 cm.  The specimen is submitted in toto in 5 cassettes.  SMS    NOTE:  Ischemia time: 2/5/2025 1425.  This specimen was placed into formalin at: 2/5/2025 1426.  B: Received in formalin, labeled with the patient's name and hospital number and \"left breast calcifications B\", are multiple irregular/cylindrical segments of yellow-white fatty soft tissue aggregating to 3.9 x 1.9 x 0.6 cm.  The specimen is submitted in toto in 4 cassettes.  SMS    NOTE:  Ischemia time: 2/5/2025 1500.  This specimen was placed into formalin at: 2/5/2025 1501.      Disclaimer       One or more of the reagents used to perform assays on this specimen MAY have contained components considered to be analyte specific reagents (ASR's).  ASR's have not been cleared or approved by the U.S. Food and Drug Administration.  These assays were developed and their performance characteristics determined by the Department of Pathology at [facility]. The FDA does not require this test to go through premarket FDA review. This test is used for clinical purposes. It should not be regarded as investigational or for research. This laboratory is certified under the Clinical Laboratory Improvement Amendments (CLIA) as qualified to perform high complexity clinical laboratory testing.  The assays were performed with appropriate positive and negative controls which stained appropriately.         9:47 AM    "

## 2025-02-28 NOTE — PROGRESS NOTES
" BREAST SURGERY NEW PATIENT VISIT    Subjective   Ludmila Escamilla \"Lubna\" is a 69 y.o. female referred by Dr. Tracee Mclean and Regla Jeffries CNP for atypical ductal hyperplasia of the left breast  On 2025 she had a stereotactic core biopsy of 2 areas of calcifications in the left breast.  Both areas showed atypical ductal hyperplasia.  The calcifications spanned about 3.5 cm from the middle to posterior aspect of the breast.    FINAL DIAGNOSIS   A. Left breast calcifications at central medial, stereotactic-guided, vacuum-assisted core biopsy:   -- Atypical ductal hyperplasia with associated microcalcifications.  -- Atypical lobular hyperplasia, see note.       B. Left breast calcifications at upper outer, stereotactic-guided, vacuum-assisted core biopsy:   -- Atypical ductal hyperplasia in part involving fragments of an intraductal papilloma with associated calcifications.  -- Atypical lobular hyperplasia, see note.        Mammogram:   Radiology review: All images and reports were personally reviewed.       Past Medical History: hypertension, osteopenia, hyperlipidemia, limb-girdle muscular dystrophy, osteoarthritis, abnormal gait.     Non-smoker    BREAST IMAGIN2025 Left diagnostic mammogram, indicates BI-RADS Category 4. There are 2 separate groups of calcifications in the left breast as described. Further evaluation with surgical consultation and stereotactic-guided biopsy is recommended.      REPRODUCTIVE HISTORY: menarche age 14, , first birth age 27, did not breastfeed, OCP's x 10-20 years, natural menopause age 51, HRT x 12 years, scattered fibroglandular tissue     FAMILY CANCER HISTORY:   none    Review of Systems   Constitutional symptoms: Denies generalized fatigue.  Denies weight change, fevers/chills, difficulty sleeping   Eyes: Denies double vision, glaucoma, cataracts.  Ear/nose/throat/mouth: Denies hearing changes, sore throat, sinus problems.  Cardiovascular: No " chest pain.  Denies irregular heartbeat.  Denies ankle swelling.  Respiratory: No wheezing, cough, or shortness of breath.  Gastrointestinal: No abdominal pain,  No nausea/vomiting.  No indigestion/heartburn.  No change in bowel habits.  No constipation or diarrhea.   Genitourinary: No urinary incontinence.  No urinary frequency.  No painful urination.  Musculoskeletal: No bone pain, no muscle pain, no joint pain.   Integumentary: No rash. No masses.  No changes in moles.  No easy bruising.  Neurological: No headaches.  No tremors. No numbness/tingling.  Psychiatric: No anxiety. No depression.  Endocrine: No excessive thirst.  Not too hot or too cold.  Not tired or fatigued.    Hematological/lymphatic: No swollen glands or blood clotting problems.  No bruising.    PHYSICAL EXAM:    General: Alert and oriented x 3.  Mood and affect are appropriate.  HEENT: EOMI, PERRLA.   Neck: supple, no masses, no cervical adenopathy.  Cardiovascular: no lower extremity edema.  Regular rhythm.  Pulmonary: breathing non labored on room air.  Clear to auscultation.  GI: Abdomen soft, no masses. No hepatomegaly or splenomegaly.  Lymph nodes: No supraclavicular or axillary adenopathy bilaterally.  Musculoskeletal: Full range of motion in the upper extremities bilaterally.  Neuro: denies dizziness, tremors  Physical Exam    Assessment/Plan     Calcifications in the left breast  Calcifications span 3.5 cm and a biopsy was done of the anteriormost and posterior most aspects.  Both showed atypical ductal hyperplasia.    A core biopsy showed atypical ductal hyperplasia (ADH). Because there are abnormal cells, an excisional (surgical) biopsy is recommended and the entire area seen on the imaging studies should be removed. There is a 10-15% risk of finding a noninvasive or invasive breast cancer. The risks, benefits and procedures have been reviewed in detail. We will schedule an excisional biopsy with magseed localization. We will need to  place a small magnetic marking clip called a Magseed in the breast to help locate the area that will be removed at surgery. This is a minor procedure which will be done by a radiologist sometime before surgery. It is similar to having the marker placed when you had the biopsy.   I have discussed with the patient the pathophysiology of the disease process and the rationale for the planned surgery. I have explained the anticipated risks and possible complications, the incidences and consequences of those risks and complications, and the expected postoperative course. Alternatives have been discussed including the alternative of no surgery. The patient has been given the opportunity to ask questions and all her questions have been answered to her satisfaction.  Individual shared decision making was implemented.    Surgery has been recommended. The risks, benefits and procedure have been reviewed with you today.   You may be scheduled for pre-surgical testing and detailed instructions will be given to you at that appointment.  The pathology results from your surgery should be available about 14 days after the procedure. I will call you with the results. If you do not hear from me within 21 days, please call the office at [phone] for your results.    Schedule left breast excisional biopsy with Magseed localization    The patient will be on vacation April 2-8.  We will schedule surgery when she returns.      Korin Hickey MD

## 2025-03-07 ENCOUNTER — OFFICE VISIT (OUTPATIENT)
Dept: SURGICAL ONCOLOGY | Facility: CLINIC | Age: 70
End: 2025-03-07
Payer: MEDICARE

## 2025-03-07 VITALS
BODY MASS INDEX: 21.09 KG/M2 | TEMPERATURE: 97.9 F | DIASTOLIC BLOOD PRESSURE: 75 MMHG | OXYGEN SATURATION: 94 % | SYSTOLIC BLOOD PRESSURE: 106 MMHG | HEART RATE: 103 BPM | WEIGHT: 108 LBS

## 2025-03-07 DIAGNOSIS — R92.8 ABNORMAL FINDING ON BREAST IMAGING: Primary | ICD-10-CM

## 2025-03-07 PROCEDURE — 3078F DIAST BP <80 MM HG: CPT | Performed by: SURGERY

## 2025-03-07 PROCEDURE — 3074F SYST BP LT 130 MM HG: CPT | Performed by: SURGERY

## 2025-03-07 PROCEDURE — 1126F AMNT PAIN NOTED NONE PRSNT: CPT | Performed by: SURGERY

## 2025-03-07 PROCEDURE — 99214 OFFICE O/P EST MOD 30 MIN: CPT | Performed by: SURGERY

## 2025-03-07 PROCEDURE — 1036F TOBACCO NON-USER: CPT | Performed by: SURGERY

## 2025-03-07 ASSESSMENT — PAIN SCALES - GENERAL: PAINLEVEL_OUTOF10: 0-NO PAIN

## 2025-03-07 ASSESSMENT — PATIENT HEALTH QUESTIONNAIRE - PHQ9
2. FEELING DOWN, DEPRESSED OR HOPELESS: NOT AT ALL
1. LITTLE INTEREST OR PLEASURE IN DOING THINGS: NOT AT ALL
SUM OF ALL RESPONSES TO PHQ9 QUESTIONS 1 & 2: 0

## 2025-03-11 DIAGNOSIS — R92.8 ABNORMAL FINDING ON BREAST IMAGING: ICD-10-CM

## 2025-04-01 ENCOUNTER — OFFICE VISIT (OUTPATIENT)
Dept: URGENT CARE | Age: 70
End: 2025-04-01
Payer: MEDICARE

## 2025-04-01 ENCOUNTER — TELEPHONE (OUTPATIENT)
Facility: CLINIC | Age: 70
End: 2025-04-01
Payer: MEDICARE

## 2025-04-01 DIAGNOSIS — B37.49 CANDIDA INFECTION OF GENITAL REGION: Primary | ICD-10-CM

## 2025-04-01 PROCEDURE — 99203 OFFICE O/P NEW LOW 30 MIN: CPT | Performed by: SPECIALIST

## 2025-04-01 PROCEDURE — 99070 SPECIAL SUPPLIES PHYS/QHP: CPT | Performed by: SPECIALIST

## 2025-04-01 RX ORDER — FLUCONAZOLE 150 MG/1
150 TABLET ORAL ONCE
Qty: 1 TABLET | Refills: 0 | Status: SHIPPED | OUTPATIENT
Start: 2025-04-01 | End: 2025-04-01

## 2025-04-01 ASSESSMENT — ENCOUNTER SYMPTOMS: CONSTITUTIONAL NEGATIVE: 1

## 2025-04-01 NOTE — PROGRESS NOTES
"Subjective   Patient ID: Ludmila Escamilla \"Caitlyn" is a 69 y.o. female. They present today with a chief complaint of No chief complaint on file..    History of Present Illness  HPI    Past Medical History  Allergies as of 04/01/2025 - Reviewed 03/07/2025   Allergen Reaction Noted    Succinylcholine Other 02/26/2024    Pollen extracts Cough 05/02/2012       (Not in a hospital admission)       Past Medical History:   Diagnosis Date    HTN (hypertension)     Limb girdle muscular dystrophy, unspecified (Multi)        Past Surgical History:   Procedure Laterality Date    COLONOSCOPY      DILATION AND CURETTAGE OF UTERUS  06/27/2023    HIP RESURFACING Bilateral     right 2011, left 2012    LASIK      TOTAL HIP ARTHROPLASTY Bilateral 2013    Conversion from hip resurfacing to total, right 6/2013, left 12/2013        reports that she has never smoked. She has never been exposed to tobacco smoke. She has never used smokeless tobacco. She reports current alcohol use of about 2.0 standard drinks of alcohol per week. She reports that she does not use drugs.    Review of Systems  Review of Systems   Constitutional: Negative.    Genitourinary:  Positive for vaginal discharge.                                  Objective    There were no vitals filed for this visit.  No LMP recorded. Patient is postmenopausal.    Physical Exam  Constitutional:       Appearance: Normal appearance.   Cardiovascular:      Rate and Rhythm: Normal rate and regular rhythm.   Pulmonary:      Effort: Pulmonary effort is normal.      Breath sounds: Normal breath sounds.   Neurological:      Mental Status: She is alert.         Procedures    Point of Care Test & Imaging Results from this visit  No results found for this visit on 04/01/25.   Imaging  No results found.    Cardiology, Vascular, and Other Imaging  No other imaging results found for the past 2 days      Diagnostic study results (if any) were reviewed by Edwina Rothman MD.    Assessment/Plan "   Allergies, medications, history, and pertinent labs/EKGs/Imaging reviewed by Edwina Rothman MD.     Medical Decision Making   Possible Candida vaginalis, no tests performed, since patient has been applying Monostat, will treat her with Fluconazol    Orders and Diagnoses  There are no diagnoses linked to this encounter.    Medical Admin Record      Patient disposition: Home    Electronically signed by Edwina Rothman MD  7:30 PM

## 2025-04-01 NOTE — TELEPHONE ENCOUNTER
Pt called and thinks she has a vaginal infection. She swims everyday. Her symptoms are stinging and burning all the time. No itching. A little odor She leaves for cancun tomorrow. Please advise thank you  Daira Feldman MA

## 2025-04-14 ENCOUNTER — LAB (OUTPATIENT)
Dept: LAB | Facility: HOSPITAL | Age: 70
End: 2025-04-14
Payer: MEDICARE

## 2025-04-14 ENCOUNTER — TELEPHONE (OUTPATIENT)
Dept: CARDIAC SURGERY | Facility: HOSPITAL | Age: 70
End: 2025-04-14
Payer: MEDICARE

## 2025-04-14 DIAGNOSIS — R92.8 OTHER ABNORMAL AND INCONCLUSIVE FINDINGS ON DIAGNOSTIC IMAGING OF BREAST: Primary | ICD-10-CM

## 2025-04-14 LAB
ANION GAP SERPL CALC-SCNC: 11 MMOL/L (ref 10–20)
BUN SERPL-MCNC: 11 MG/DL (ref 6–23)
CALCIUM SERPL-MCNC: 9.6 MG/DL (ref 8.6–10.6)
CHLORIDE SERPL-SCNC: 99 MMOL/L (ref 98–107)
CO2 SERPL-SCNC: 32 MMOL/L (ref 21–32)
CREAT SERPL-MCNC: 0.5 MG/DL (ref 0.5–1.05)
EGFRCR SERPLBLD CKD-EPI 2021: >90 ML/MIN/1.73M*2
ERYTHROCYTE [DISTWIDTH] IN BLOOD BY AUTOMATED COUNT: 13.3 % (ref 11.5–14.5)
GLUCOSE SERPL-MCNC: 103 MG/DL (ref 74–99)
HCT VFR BLD AUTO: 40.4 % (ref 36–46)
HGB BLD-MCNC: 13 G/DL (ref 12–16)
MCH RBC QN AUTO: 30.4 PG (ref 26–34)
MCHC RBC AUTO-ENTMCNC: 32.2 G/DL (ref 32–36)
MCV RBC AUTO: 95 FL (ref 80–100)
NRBC BLD-RTO: 0 /100 WBCS (ref 0–0)
PLATELET # BLD AUTO: 298 X10*3/UL (ref 150–450)
POTASSIUM SERPL-SCNC: 3.8 MMOL/L (ref 3.5–5.3)
RBC # BLD AUTO: 4.27 X10*6/UL (ref 4–5.2)
SODIUM SERPL-SCNC: 138 MMOL/L (ref 136–145)
WBC # BLD AUTO: 7 X10*3/UL (ref 4.4–11.3)

## 2025-04-14 PROCEDURE — 80048 BASIC METABOLIC PNL TOTAL CA: CPT

## 2025-04-14 PROCEDURE — 85027 COMPLETE CBC AUTOMATED: CPT

## 2025-04-15 NOTE — PROGRESS NOTES
"Ludmila Escamilla female   1955 69 y.o.   [id number]      Chief Complaint  Magseed placement    History Of Present Illness  Lumdila Escamilla \"Lubna\" is a 69 y.o. female presenting for Magseed placement. 2025 2 site left breast core biopsy showed atypical ductal hyperplasia and atypical lobular hyperplasia. On 2025 Dr. Hickey will perform a left breast excisional biopsy with Magseed localization. She denies family history breast cancer.      REPRODUCTIVE HISTORY: menarche age 14, , first birth age 27, did not breastfeed, OCP's x 10-20 years, natural menopause age 56, HRT x 12 years, scattered fibroglandular tissue                                    FAMILY CANCER HISTORY:   None      Surgical History  She has a past surgical history that includes Dilation and curettage of uterus (2023); Total hip arthroplasty (Bilateral, ); Hip resurfacing (Bilateral); Colonoscopy; and LASIK.     Social History  She reports that she has never smoked. She has never been exposed to tobacco smoke. She has never used smokeless tobacco. She reports current alcohol use of about 2.0 standard drinks of alcohol per week. She reports that she does not use drugs.    Family History  Family History   Problem Relation Name Age of Onset    Heart disease Father Fernando Obando         Allergies  Succinylcholine and Pollen extracts    Medications  Current Outpatient Medications   Medication Instructions    amLODIPine-benazepriL (Lotrel) 5-10 mg capsule 1 capsule, Daily RT    ascorbic acid, vitamin C, 500 mg capsule Take by mouth.    azelastine (Astelin) 137 mcg (0.1 %) nasal spray 2 sprays, 2 times daily    cyanocobalamin (VITAMIN B-12) 250 mcg, Daily    DIETARY SUPPLEMENT ORAL 1 Application, Once    fluticasone (Flonase) 50 mcg/actuation nasal spray 1 spray, Daily RT         REVIEW OF SYSTEMS    Constitutional:  Negative for appetite change, fatigue, fever and unexpected weight change.   HENT:  Negative for ear pain, hearing " loss, nosebleeds, sore throat and trouble swallowing.    Eyes:  Negative for discharge, itching and visual disturbance.   Respiratory:  Negative for cough, chest tightness and shortness of breath.    Cardiovascular:  Negative for chest pain, palpitations and leg swelling.   Breast: as indicated in HPI  Gastrointestinal:  Negative for abdominal pain, constipation, diarrhea and nausea.   Endocrine: Negative for cold intolerance and heat intolerance.   Genitourinary:  Negative for dysuria, frequency, hematuria, pelvic pain and vaginal bleeding.   Musculoskeletal:  Negative for arthralgias, back pain, gait problem, joint swelling and myalgias.   Skin:  Negative for color change and rash.   Allergic/Immunologic: Negative for environmental allergies and food allergies.   Neurological:  Negative for dizziness, tremors, speech difficulty, weakness, numbness and headaches.   Hematological:  Does not bruise/bleed easily.   Psychiatric/Behavioral:  Negative for agitation, dysphoric mood and sleep disturbance. The patient is not nervous/anxious.         Past Medical History  She has a past medical history of HTN (hypertension) and Limb girdle muscular dystrophy, unspecified.    She has no past medical history of Personal history of irradiation.     Physical Exam  Patient is alert and oriented x3 and in a relaxed and appropriate mood. Her gait is steady and hand grasps are equal. Sclera is clear. The breasts are nearly symmetrical. The tissue is soft without palpable abnormalities, discrete nodules or masses. The skin and nipples appear normal. There is no cervical, supraclavicular or axillary lymphadenopathy. Heart rate and rhythm normal, S1 and S2 appreciated. The lungs are clear to auscultation bilaterally. Abdomen is soft and non-tender.       Physical Exam     Last Recorded Vitals  Vitals:    04/23/25 0856   BP: 115/69   Pulse: 87   Temp: 36.5 °C (97.7 °F)   SpO2: 99%         Assessment/Plan       Proceed to left breast  magseed placement. Surgery is scheduled with Dr. Hickey on 5/1/25.     Patient Discussion/Summary  You can see your health information, review clinical summaries from office visits & test results online when you follow your health with MY  Chart, a personal health record. To sign up go to [email or web address]. If you need assistance with signing up or trouble getting into your account call [employer] Patient Line 24/7 at [phone].    My office phone number is [phone] if you need to get in touch with me or have additional questions or concerns. Thank you for choosing [facility] and trusting me as your healthcare provider. I look forward to seeing you again at your next office visit. I am honored to be a provider on your health care team and I remain dedicated to helping you achieve your health goals.      Kathleen Buck, APRN-CNP

## 2025-04-15 NOTE — H&P (VIEW-ONLY)
"Ludmila Escamilla female   1955 69 y.o.   [id number]      Chief Complaint  Magseed placement    History Of Present Illness  Ludmila Escamilla \"Lubna\" is a 69 y.o. female presenting for Magseed placement. 2025 2 site left breast core biopsy showed atypical ductal hyperplasia and atypical lobular hyperplasia. On 2025 Dr. Hickey will perform a left breast excisional biopsy with Magseed localization. She denies family history breast cancer.      REPRODUCTIVE HISTORY: menarche age 14, , first birth age 27, did not breastfeed, OCP's x 10-20 years, natural menopause age 56, HRT x 12 years, scattered fibroglandular tissue                                    FAMILY CANCER HISTORY:   None      Surgical History  She has a past surgical history that includes Dilation and curettage of uterus (2023); Total hip arthroplasty (Bilateral, ); Hip resurfacing (Bilateral); Colonoscopy; and LASIK.     Social History  She reports that she has never smoked. She has never been exposed to tobacco smoke. She has never used smokeless tobacco. She reports current alcohol use of about 2.0 standard drinks of alcohol per week. She reports that she does not use drugs.    Family History  Family History   Problem Relation Name Age of Onset    Heart disease Father Fernando Obando         Allergies  Succinylcholine and Pollen extracts    Medications  Current Outpatient Medications   Medication Instructions    amLODIPine-benazepriL (Lotrel) 5-10 mg capsule 1 capsule, Daily RT    ascorbic acid, vitamin C, 500 mg capsule Take by mouth.    azelastine (Astelin) 137 mcg (0.1 %) nasal spray 2 sprays, 2 times daily    cyanocobalamin (VITAMIN B-12) 250 mcg, Daily    DIETARY SUPPLEMENT ORAL 1 Application, Once    fluticasone (Flonase) 50 mcg/actuation nasal spray 1 spray, Daily RT         REVIEW OF SYSTEMS    Constitutional:  Negative for appetite change, fatigue, fever and unexpected weight change.   HENT:  Negative for ear pain, hearing " loss, nosebleeds, sore throat and trouble swallowing.    Eyes:  Negative for discharge, itching and visual disturbance.   Respiratory:  Negative for cough, chest tightness and shortness of breath.    Cardiovascular:  Negative for chest pain, palpitations and leg swelling.   Breast: as indicated in HPI  Gastrointestinal:  Negative for abdominal pain, constipation, diarrhea and nausea.   Endocrine: Negative for cold intolerance and heat intolerance.   Genitourinary:  Negative for dysuria, frequency, hematuria, pelvic pain and vaginal bleeding.   Musculoskeletal:  Negative for arthralgias, back pain, gait problem, joint swelling and myalgias.   Skin:  Negative for color change and rash.   Allergic/Immunologic: Negative for environmental allergies and food allergies.   Neurological:  Negative for dizziness, tremors, speech difficulty, weakness, numbness and headaches.   Hematological:  Does not bruise/bleed easily.   Psychiatric/Behavioral:  Negative for agitation, dysphoric mood and sleep disturbance. The patient is not nervous/anxious.         Past Medical History  She has a past medical history of HTN (hypertension) and Limb girdle muscular dystrophy, unspecified.    She has no past medical history of Personal history of irradiation.     Physical Exam  Patient is alert and oriented x3 and in a relaxed and appropriate mood. Her gait is steady and hand grasps are equal. Sclera is clear. The breasts are nearly symmetrical. The tissue is soft without palpable abnormalities, discrete nodules or masses. The skin and nipples appear normal. There is no cervical, supraclavicular or axillary lymphadenopathy. Heart rate and rhythm normal, S1 and S2 appreciated. The lungs are clear to auscultation bilaterally. Abdomen is soft and non-tender.       Physical Exam     Last Recorded Vitals  Vitals:    04/23/25 0856   BP: 115/69   Pulse: 87   Temp: 36.5 °C (97.7 °F)   SpO2: 99%         Assessment/Plan       Proceed to left breast  magseed placement. Surgery is scheduled with Dr. Hickey on 5/1/25.     Patient Discussion/Summary  You can see your health information, review clinical summaries from office visits & test results online when you follow your health with MY  Chart, a personal health record. To sign up go to [email or web address]. If you need assistance with signing up or trouble getting into your account call [facility] [employer] Patient Line 24/7 at [phone].    My office phone number is [phone] if you need to get in touch with me or have additional questions or concerns. Thank you for choosing [facility] and trusting me as your healthcare provider. I look forward to seeing you again at your next office visit. I am honored to be a provider on your health care team and I remain dedicated to helping you achieve your health goals.      Kathleen Buck, APRN-CNP

## 2025-04-23 ENCOUNTER — HOSPITAL ENCOUNTER (OUTPATIENT)
Dept: RADIOLOGY | Facility: CLINIC | Age: 70
Discharge: HOME | End: 2025-04-23
Payer: MEDICARE

## 2025-04-23 ENCOUNTER — PROCEDURE VISIT (OUTPATIENT)
Dept: SURGICAL ONCOLOGY | Facility: CLINIC | Age: 70
End: 2025-04-23
Payer: MEDICARE

## 2025-04-23 VITALS
SYSTOLIC BLOOD PRESSURE: 115 MMHG | OXYGEN SATURATION: 99 % | WEIGHT: 107.2 LBS | BODY MASS INDEX: 20.94 KG/M2 | HEART RATE: 87 BPM | DIASTOLIC BLOOD PRESSURE: 69 MMHG | TEMPERATURE: 97.7 F

## 2025-04-23 DIAGNOSIS — R92.8 ABNORMAL FINDING ON BREAST IMAGING: Primary | ICD-10-CM

## 2025-04-23 DIAGNOSIS — R92.8 ABNORMAL FINDING ON BREAST IMAGING: ICD-10-CM

## 2025-04-23 DIAGNOSIS — R92.1 CALCIFICATION OF LEFT BREAST: ICD-10-CM

## 2025-04-23 PROCEDURE — 99213 OFFICE O/P EST LOW 20 MIN: CPT

## 2025-04-23 PROCEDURE — 2780000003 HC OR 278 NO HCPCS

## 2025-04-23 PROCEDURE — 2500000004 HC RX 250 GENERAL PHARMACY W/ HCPCS (ALT 636 FOR OP/ED): Mod: JZ | Performed by: RADIOLOGY

## 2025-04-23 PROCEDURE — 19281 PERQ DEVICE BREAST 1ST IMAG: CPT | Mod: LT

## 2025-04-23 PROCEDURE — 19282 PERQ DEVICE BREAST EA IMAG: CPT | Mod: LT

## 2025-04-23 PROCEDURE — C1739 HC OR 278 NO HCPCS: HCPCS

## 2025-04-23 RX ADMIN — Medication 10 ML: at 09:40

## 2025-04-23 ASSESSMENT — PAIN SCALES - GENERAL
PAINLEVEL_OUTOF10: 0 - NO PAIN
PAINLEVEL_OUTOF10: 0-NO PAIN
PAINLEVEL_OUTOF10: 3
PAINLEVEL_OUTOF10: 3
PAINLEVEL_OUTOF10: 0 - NO PAIN

## 2025-04-23 NOTE — PATIENT INSTRUCTIONS
You can see your health information, review clinical summaries from office visits & test results online when you follow your health with MY  Chart, a personal health record. To sign up go to [email or web address]. If you need assistance with signing up or trouble getting into your account call [employer] Patient Line 24/7 at [phone].    My office phone number is [phone] if you need to get in touch with me or have additional questions or concerns. Thank you for choosing [facility] and trusting me as your healthcare provider. I look forward to seeing you again at your next office visit. I am honored to be a provider on your health care team and I remain dedicated to helping you achieve your health goals.

## 2025-04-23 NOTE — DISCHARGE INSTRUCTIONS
Post procedure care reviewed with patient, ok to resume normal activity with no restrictions. Patient verbalized understanding and will follow up surgery as planned.  Patient left breast center ambulatory at 1005.

## 2025-04-30 ENCOUNTER — ANESTHESIA EVENT (OUTPATIENT)
Dept: OPERATING ROOM | Facility: HOSPITAL | Age: 70
End: 2025-04-30
Payer: MEDICARE

## 2025-04-30 ENCOUNTER — TELEPHONE (OUTPATIENT)
Dept: SURGICAL ONCOLOGY | Facility: CLINIC | Age: 70
End: 2025-04-30
Payer: MEDICARE

## 2025-04-30 NOTE — ANESTHESIA PREPROCEDURE EVALUATION
"Patient: Ludmila Escamilla \"Lubna\"    Procedure Information       Date/Time: 05/01/25 0715    Procedure: Left Breast Excisional Biopsy with Magseed Localization (Left: Breast)    Location: [facility] OR 03 / Virtual [facility] OR    Surgeons: Korin Hickey MD            Relevant Problems   Anesthesia   (+) PONV (postoperative nausea and vomiting)      Cardiac   (+) HTN (hypertension)      Pulmonary  Env allergies  Bronchitis hx      Neuro   (+) Neuromuscular disease (Multi) (Limb girdle muscular dystrophy; dx 2020; LE weakness, fatigue)      GI (within normal limits)      /Renal (within normal limits)      Liver (within normal limits)      Endocrine (within normal limits)                                                         Pre-Anesthesia Evaluation                                         Ludmila Escamilla \"Caitlyn" is a 69 y.o. female who presents for the above mentioned procedure due to Abnormal finding on breast imaging [R92.8]    Medical History[1]  Surgical History[2]  Social History[3]  RX Allergies[4]  Current Medications[5]  Current Outpatient Medications   Medication Instructions    amLODIPine-benazepriL (Lotrel) 5-10 mg capsule 1 capsule, Daily RT    ascorbic acid, vitamin C, 500 mg capsule Take by mouth.    azelastine (Astelin) 137 mcg (0.1 %) nasal spray 2 sprays, 2 times daily    cyanocobalamin (VITAMIN B-12) 250 mcg, Daily    DIETARY SUPPLEMENT ORAL 1 Application, Once    fluticasone (Flonase) 50 mcg/actuation nasal spray 1 spray, Daily RT       No medication comments found.  Visit Vitals  OB Status Postmenopausal   Smoking Status Never                             4/23/2025     8:56 AM 3/7/2025     8:30 AM 2/5/2025     1:00 PM 11/6/2024     9:17 AM 5/8/2024     9:01 AM 4/22/2024    10:00 AM 4/22/2024     9:45 AM   BP REVIEW   BP (ultimate) 115/69 106/75 123/75 120/66 107/71 116/59 118/60     Recent Labs     04/14/25  1345 03/01/24  0650   WBC 7.0 5.8   HGB 13.0 8.7*   HCT 40.4 26.3*    241   MCV 95 93 " "    Recent Labs     04/14/25  1345 03/01/24  0650   EGFR >90 >90   ANIONGAP 11 12   BUN 11 6   CREATININE 0.50 0.46*    139   K 3.8 3.6   CL 99 102   CO2 32 29   GLUCOSE 103* 85   CALCIUM 9.6 8.0*     No results for input(s): \"HGBA1C\", \"TSH\", \"FREET4\", \"PTH\", \"BNP\", \"TROPHS\" in the last 99060 hours.    No lab exists for component: \"MAG\"  Recent Labs     02/26/24  1351   BILITOT 1.0   PROT 6.7   ALBUMIN 4.1   ALKPHOS 37   ALT 16   AST 20     Recent Labs     02/26/24  2044   PROTIME 12.1   INR 1.1     No results found for: \"FERRITIN\", \"TIBC\", \"IRONSAT\"  No results found for: \"STAPHMRSASCR\"  No results for input(s): \"AMPHETAMINE\", \"MAMPHBLDS\", \"BARBITURATE\", \"BENZODIAZ\", \"BUPRENBLDS\", \"CANNABBLDS\", \"COCBLDS\", \"METHABLDS\", \"OXYBLDS\", \"PCPBLDS\", \"OPIATBLDS\", \"DRBLDCOMM\" in the last 06785 hours.  No results for input(s): \"PHART\", \"UHM7DCW\", \"PO2ART\", \"WYD0NZG\", \"Q8PATBQI\", \"BEART\", \"G9KUVTWL\" in the last 00830 hours.      Lab Results   Component Value Date    ABO A 04/22/2024     EKG No results found for this or any previous visit (from the past 4464 hours).  Ejection Fractions:No results found for: \"EF\"  EchocardiogramNo results found for this or any previous visit from the past 38782 days.    Stress TestingNo results found for this or any previous visit from the past 92108 days.    Cardiac CatheterizationNo results found for this or any previous visit from the past 42621 days.    Cardiac Scoring No results found for this or any previous visit from the past 64634 days.    AAA screenNo results found for this or any previous visit from the past 77647 days.    Carotid DopplerNo results found for this or any previous visit from the past 86546 days.    X Ray === 02/26/24 ===    XR CHEST 1 VIEW    - Impression -  1.  No evidence of acute cardiopulmonary process.        MACRO:  None    Signed by: Tami Vyas 2/26/2024 9:12 PM  Dictation workstation:   [id number]  Pulmonary Function Tests  No results found for: " "\"FEV1\", \"FVC\", \"PBJ3WRS\", \"TLC\", \"DLCO\"   OTHER: No results found for this or any previous visit from the past 1825 days.        No results found for: \"PREGTESTUR\", \"PREGSERUM\", \"HCG\", \"HCGQUANT\"  The ASCVD Risk score (Noris ROB, et al., 2019) failed to calculate for the following reasons:    Cannot find a previous HDL lab    Cannot find a previous total cholesterol lab    Code Status: Full Code                   Clinical information reviewed:                   NPO Detail:  No data recorded     Physical Exam    Airway  Mallampati: III     Cardiovascular    Dental    Pulmonary    Abdominal            Anesthesia Plan    History of general anesthesia?: yes  History of complications of general anesthesia?: no    ASA 2     general     intravenous induction   Anesthetic plan and risks discussed with patient.           [1]   Past Medical History:  Diagnosis Date    HTN (hypertension)     Limb girdle muscular dystrophy, unspecified    [2]   Past Surgical History:  Procedure Laterality Date    COLONOSCOPY      DILATION AND CURETTAGE OF UTERUS  06/27/2023    HIP RESURFACING Bilateral     right 2011, left 2012    LASIK      TOTAL HIP ARTHROPLASTY Bilateral 2013    Conversion from hip resurfacing to total, right 6/2013, left 12/2013   [3]   Social History  Tobacco Use    Smoking status: Never     Passive exposure: Never    Smokeless tobacco: Never   Vaping Use    Vaping status: Never Used   Substance Use Topics    Alcohol use: Yes     Alcohol/week: 2.0 standard drinks of alcohol     Types: 2 Glasses of wine per week    Drug use: Never   [4]   Allergies  Allergen Reactions    Succinylcholine Other     Risk of rhabdomyolysis due to limb-girdle muscular dystrophy    Pollen Extracts Cough   [5] No current facility-administered medications for this encounter.    Current Outpatient Medications:     amLODIPine-benazepriL (Lotrel) 5-10 mg capsule, Take 1 capsule by mouth once daily., Disp: , Rfl:     ascorbic acid, vitamin C, 500 mg " capsule, Take by mouth., Disp: , Rfl:     azelastine (Astelin) 137 mcg (0.1 %) nasal spray, Administer 2 sprays into each nostril 2 times a day., Disp: , Rfl:     cyanocobalamin (Vitamin B-12) 250 mcg tablet, Take 1 tablet (250 mcg) by mouth once daily., Disp: , Rfl:     DIETARY SUPPLEMENT ORAL, Take 1 Application by mouth 1 time. Vital reds, Disp: , Rfl:     fluticasone (Flonase) 50 mcg/actuation nasal spray, 1 spray by Does not apply route once daily., Disp: , Rfl:

## 2025-04-30 NOTE — TELEPHONE ENCOUNTER
Patient called in today to ask about pre op medication.  She has had hip replacements and wanted to know if she needed to take antibiotics prior to surgery.  I told her to tell the anesthesia team upon arrival to  the hospital.  I will also give Dr. Hickey the message.  All questions were answered and call was ended.

## 2025-05-01 ENCOUNTER — HOSPITAL ENCOUNTER (OUTPATIENT)
Dept: RADIOLOGY | Facility: CLINIC | Age: 70
Discharge: HOME | End: 2025-05-01
Payer: MEDICARE

## 2025-05-01 ENCOUNTER — PHARMACY VISIT (OUTPATIENT)
Dept: PHARMACY | Facility: CLINIC | Age: 70
End: 2025-05-01
Payer: MEDICARE

## 2025-05-01 ENCOUNTER — HOSPITAL ENCOUNTER (OUTPATIENT)
Facility: HOSPITAL | Age: 70
Setting detail: OUTPATIENT SURGERY
Discharge: HOME | End: 2025-05-01
Attending: SURGERY | Admitting: SURGERY
Payer: MEDICARE

## 2025-05-01 ENCOUNTER — ANESTHESIA (OUTPATIENT)
Dept: OPERATING ROOM | Facility: HOSPITAL | Age: 70
End: 2025-05-01
Payer: MEDICARE

## 2025-05-01 VITALS
HEART RATE: 88 BPM | DIASTOLIC BLOOD PRESSURE: 63 MMHG | RESPIRATION RATE: 16 BRPM | BODY MASS INDEX: 20.78 KG/M2 | OXYGEN SATURATION: 99 % | SYSTOLIC BLOOD PRESSURE: 123 MMHG | TEMPERATURE: 96.8 F | HEIGHT: 60 IN | WEIGHT: 105.82 LBS

## 2025-05-01 DIAGNOSIS — R92.8 ABNORMAL FINDING ON BREAST IMAGING: Primary | ICD-10-CM

## 2025-05-01 DIAGNOSIS — R92.8 ABNORMAL FINDING ON BREAST IMAGING: ICD-10-CM

## 2025-05-01 PROCEDURE — 7100000010 HC PHASE TWO TIME - EACH INCREMENTAL 1 MINUTE: Performed by: SURGERY

## 2025-05-01 PROCEDURE — 2500000004 HC RX 250 GENERAL PHARMACY W/ HCPCS (ALT 636 FOR OP/ED): Performed by: STUDENT IN AN ORGANIZED HEALTH CARE EDUCATION/TRAINING PROGRAM

## 2025-05-01 PROCEDURE — 3700000001 HC GENERAL ANESTHESIA TIME - INITIAL BASE CHARGE: Performed by: SURGERY

## 2025-05-01 PROCEDURE — 2500000004 HC RX 250 GENERAL PHARMACY W/ HCPCS (ALT 636 FOR OP/ED): Mod: JZ | Performed by: ANESTHESIOLOGY

## 2025-05-01 PROCEDURE — 2500000004 HC RX 250 GENERAL PHARMACY W/ HCPCS (ALT 636 FOR OP/ED): Performed by: SURGERY

## 2025-05-01 PROCEDURE — 2720000007 HC OR 272 NO HCPCS: Performed by: SURGERY

## 2025-05-01 PROCEDURE — RXMED WILLOW AMBULATORY MEDICATION CHARGE

## 2025-05-01 PROCEDURE — 76098 X-RAY EXAM SURGICAL SPECIMEN: CPT | Mod: LT

## 2025-05-01 PROCEDURE — 2500000001 HC RX 250 WO HCPCS SELF ADMINISTERED DRUGS (ALT 637 FOR MEDICARE OP): Performed by: ANESTHESIOLOGY

## 2025-05-01 PROCEDURE — 3600000007 HC OR TIME - EACH INCREMENTAL 1 MINUTE - PROCEDURE LEVEL TWO: Performed by: SURGERY

## 2025-05-01 PROCEDURE — 7100000009 HC PHASE TWO TIME - INITIAL BASE CHARGE: Performed by: SURGERY

## 2025-05-01 PROCEDURE — A19125 PR EXCISE BREAST LES W XRAY MARKER: Performed by: ANESTHESIOLOGY

## 2025-05-01 PROCEDURE — 19125 EXCISION BREAST LESION: CPT | Performed by: SURGERY

## 2025-05-01 PROCEDURE — 2500000005 HC RX 250 GENERAL PHARMACY W/O HCPCS: Performed by: STUDENT IN AN ORGANIZED HEALTH CARE EDUCATION/TRAINING PROGRAM

## 2025-05-01 PROCEDURE — 76098 X-RAY EXAM SURGICAL SPECIMEN: CPT | Mod: LEFT SIDE | Performed by: STUDENT IN AN ORGANIZED HEALTH CARE EDUCATION/TRAINING PROGRAM

## 2025-05-01 PROCEDURE — 3700000002 HC GENERAL ANESTHESIA TIME - EACH INCREMENTAL 1 MINUTE: Performed by: SURGERY

## 2025-05-01 PROCEDURE — 76098 X-RAY EXAM SURGICAL SPECIMEN: CPT | Performed by: SURGERY

## 2025-05-01 PROCEDURE — 2500000005 HC RX 250 GENERAL PHARMACY W/O HCPCS: Mod: JZ | Performed by: SURGERY

## 2025-05-01 PROCEDURE — 7100000002 HC RECOVERY ROOM TIME - EACH INCREMENTAL 1 MINUTE: Performed by: SURGERY

## 2025-05-01 PROCEDURE — 7100000001 HC RECOVERY ROOM TIME - INITIAL BASE CHARGE: Performed by: SURGERY

## 2025-05-01 PROCEDURE — 3600000002 HC OR TIME - INITIAL BASE CHARGE - PROCEDURE LEVEL TWO: Performed by: SURGERY

## 2025-05-01 RX ORDER — SODIUM CHLORIDE 0.9 G/100ML
INJECTION, SOLUTION IRRIGATION AS NEEDED
Status: DISCONTINUED | OUTPATIENT
Start: 2025-05-01 | End: 2025-05-01 | Stop reason: HOSPADM

## 2025-05-01 RX ORDER — MEPERIDINE HYDROCHLORIDE 25 MG/ML
12.5 INJECTION INTRAMUSCULAR; INTRAVENOUS; SUBCUTANEOUS EVERY 10 MIN PRN
Status: DISCONTINUED | OUTPATIENT
Start: 2025-05-01 | End: 2025-05-01 | Stop reason: HOSPADM

## 2025-05-01 RX ORDER — CEFAZOLIN 1 G/1
INJECTION, POWDER, FOR SOLUTION INTRAVENOUS AS NEEDED
Status: DISCONTINUED | OUTPATIENT
Start: 2025-05-01 | End: 2025-05-01

## 2025-05-01 RX ORDER — PROPOFOL 10 MG/ML
INJECTION, EMULSION INTRAVENOUS AS NEEDED
Status: DISCONTINUED | OUTPATIENT
Start: 2025-05-01 | End: 2025-05-01

## 2025-05-01 RX ORDER — ONDANSETRON HYDROCHLORIDE 2 MG/ML
INJECTION, SOLUTION INTRAVENOUS AS NEEDED
Status: DISCONTINUED | OUTPATIENT
Start: 2025-05-01 | End: 2025-05-01

## 2025-05-01 RX ORDER — OXYCODONE HYDROCHLORIDE 5 MG/1
5 TABLET ORAL EVERY 4 HOURS PRN
Status: DISCONTINUED | OUTPATIENT
Start: 2025-05-01 | End: 2025-05-01 | Stop reason: HOSPADM

## 2025-05-01 RX ORDER — TRAMADOL HYDROCHLORIDE 50 MG/1
50 TABLET ORAL EVERY 8 HOURS PRN
Qty: 10 TABLET | Refills: 0 | Status: SHIPPED | OUTPATIENT
Start: 2025-05-01

## 2025-05-01 RX ORDER — MIDAZOLAM HYDROCHLORIDE 1 MG/ML
INJECTION INTRAMUSCULAR; INTRAVENOUS AS NEEDED
Status: DISCONTINUED | OUTPATIENT
Start: 2025-05-01 | End: 2025-05-01

## 2025-05-01 RX ORDER — LIDOCAINE HYDROCHLORIDE 10 MG/ML
0.1 INJECTION, SOLUTION EPIDURAL; INFILTRATION; INTRACAUDAL; PERINEURAL ONCE
Status: DISCONTINUED | OUTPATIENT
Start: 2025-05-01 | End: 2025-05-01 | Stop reason: HOSPADM

## 2025-05-01 RX ORDER — METOCLOPRAMIDE HYDROCHLORIDE 5 MG/ML
10 INJECTION INTRAMUSCULAR; INTRAVENOUS ONCE AS NEEDED
Status: DISCONTINUED | OUTPATIENT
Start: 2025-05-01 | End: 2025-05-01 | Stop reason: HOSPADM

## 2025-05-01 RX ORDER — NORETHINDRONE AND ETHINYL ESTRADIOL 0.5-0.035
KIT ORAL AS NEEDED
Status: DISCONTINUED | OUTPATIENT
Start: 2025-05-01 | End: 2025-05-01

## 2025-05-01 RX ORDER — SODIUM CHLORIDE, SODIUM LACTATE, POTASSIUM CHLORIDE, CALCIUM CHLORIDE 600; 310; 30; 20 MG/100ML; MG/100ML; MG/100ML; MG/100ML
INJECTION, SOLUTION INTRAVENOUS CONTINUOUS PRN
Status: DISCONTINUED | OUTPATIENT
Start: 2025-05-01 | End: 2025-05-01

## 2025-05-01 RX ORDER — ONDANSETRON HYDROCHLORIDE 2 MG/ML
4 INJECTION, SOLUTION INTRAVENOUS ONCE AS NEEDED
Status: DISCONTINUED | OUTPATIENT
Start: 2025-05-01 | End: 2025-05-01 | Stop reason: HOSPADM

## 2025-05-01 RX ORDER — FENTANYL CITRATE 50 UG/ML
INJECTION, SOLUTION INTRAMUSCULAR; INTRAVENOUS AS NEEDED
Status: DISCONTINUED | OUTPATIENT
Start: 2025-05-01 | End: 2025-05-01

## 2025-05-01 RX ORDER — LIDOCAINE HYDROCHLORIDE 20 MG/ML
INJECTION, SOLUTION INFILTRATION; PERINEURAL AS NEEDED
Status: DISCONTINUED | OUTPATIENT
Start: 2025-05-01 | End: 2025-05-01

## 2025-05-01 RX ORDER — PHENYLEPHRINE HCL IN 0.9% NACL 0.4MG/10ML
SYRINGE (ML) INTRAVENOUS AS NEEDED
Status: DISCONTINUED | OUTPATIENT
Start: 2025-05-01 | End: 2025-05-01

## 2025-05-01 RX ADMIN — EPHEDRINE SULFATE 5 MG: 50 INJECTION, SOLUTION INTRAVENOUS at 07:39

## 2025-05-01 RX ADMIN — DEXAMETHASONE SODIUM PHOSPHATE 8 MG: 4 INJECTION, SOLUTION INTRAMUSCULAR; INTRAVENOUS at 07:42

## 2025-05-01 RX ADMIN — LIDOCAINE HYDROCHLORIDE 60 MG: 20 INJECTION, SOLUTION INFILTRATION; PERINEURAL at 07:30

## 2025-05-01 RX ADMIN — PROPOFOL 170 MG: 10 INJECTION, EMULSION INTRAVENOUS at 07:30

## 2025-05-01 RX ADMIN — Medication 100 MCG: at 07:33

## 2025-05-01 RX ADMIN — HYDROMORPHONE HYDROCHLORIDE 0.2 MG: 1 INJECTION, SOLUTION INTRAMUSCULAR; INTRAVENOUS; SUBCUTANEOUS at 08:24

## 2025-05-01 RX ADMIN — FENTANYL CITRATE 50 MCG: 50 INJECTION, SOLUTION INTRAMUSCULAR; INTRAVENOUS at 07:30

## 2025-05-01 RX ADMIN — ONDANSETRON 4 MG: 2 INJECTION, SOLUTION INTRAMUSCULAR; INTRAVENOUS at 08:04

## 2025-05-01 RX ADMIN — EPHEDRINE SULFATE 5 MG: 50 INJECTION, SOLUTION INTRAVENOUS at 07:57

## 2025-05-01 RX ADMIN — HYDROMORPHONE HYDROCHLORIDE 0.5 MG: 1 INJECTION, SOLUTION INTRAMUSCULAR; INTRAVENOUS; SUBCUTANEOUS at 08:40

## 2025-05-01 RX ADMIN — CEFAZOLIN 2 G: 330 INJECTION, POWDER, FOR SOLUTION INTRAMUSCULAR; INTRAVENOUS at 07:30

## 2025-05-01 RX ADMIN — HYDROMORPHONE HYDROCHLORIDE 0.5 MG: 1 INJECTION, SOLUTION INTRAMUSCULAR; INTRAVENOUS; SUBCUTANEOUS at 08:32

## 2025-05-01 RX ADMIN — EPHEDRINE SULFATE 5 MG: 50 INJECTION, SOLUTION INTRAVENOUS at 07:48

## 2025-05-01 RX ADMIN — Medication 150 MCG: at 07:37

## 2025-05-01 RX ADMIN — MIDAZOLAM HYDROCHLORIDE 2 MG: 1 INJECTION, SOLUTION INTRAMUSCULAR; INTRAVENOUS at 07:25

## 2025-05-01 RX ADMIN — HYDROMORPHONE HYDROCHLORIDE 0.2 MG: 1 INJECTION, SOLUTION INTRAMUSCULAR; INTRAVENOUS; SUBCUTANEOUS at 08:54

## 2025-05-01 RX ADMIN — SODIUM CHLORIDE, POTASSIUM CHLORIDE, SODIUM LACTATE AND CALCIUM CHLORIDE: 600; 310; 30; 20 INJECTION, SOLUTION INTRAVENOUS at 07:26

## 2025-05-01 RX ADMIN — OXYCODONE HYDROCHLORIDE 5 MG: 5 TABLET ORAL at 09:06

## 2025-05-01 ASSESSMENT — PAIN - FUNCTIONAL ASSESSMENT
PAIN_FUNCTIONAL_ASSESSMENT: 0-10
PAIN_FUNCTIONAL_ASSESSMENT: UNABLE TO SELF-REPORT
PAIN_FUNCTIONAL_ASSESSMENT: 0-10
PAIN_FUNCTIONAL_ASSESSMENT: UNABLE TO SELF-REPORT

## 2025-05-01 ASSESSMENT — PAIN SCALES - GENERAL
PAINLEVEL_OUTOF10: 5 - MODERATE PAIN
PAINLEVEL_OUTOF10: 4
PAIN_LEVEL: 0
PAINLEVEL_OUTOF10: 7
PAINLEVEL_OUTOF10: 3
PAINLEVEL_OUTOF10: 0 - NO PAIN
PAINLEVEL_OUTOF10: 7
PAINLEVEL_OUTOF10: 4
PAINLEVEL_OUTOF10: 0 - NO PAIN
PAINLEVEL_OUTOF10: 3
PAINLEVEL_OUTOF10: 7

## 2025-05-01 ASSESSMENT — COLUMBIA-SUICIDE SEVERITY RATING SCALE - C-SSRS
1. IN THE PAST MONTH, HAVE YOU WISHED YOU WERE DEAD OR WISHED YOU COULD GO TO SLEEP AND NOT WAKE UP?: NO
2. HAVE YOU ACTUALLY HAD ANY THOUGHTS OF KILLING YOURSELF?: NO
6. HAVE YOU EVER DONE ANYTHING, STARTED TO DO ANYTHING, OR PREPARED TO DO ANYTHING TO END YOUR LIFE?: NO

## 2025-05-01 NOTE — PERIOPERATIVE NURSING NOTE
0933 Pt to bay 54. Pt  brought back to bedside.    0998 Discharge instructions provided to pt and family. Educated on medications, effects of anesthesia, and homecoming care. Pt and family verbalizing understanding of all instructions provided at this time. All questions and concerns answered. Pt given contact information for provider.

## 2025-05-01 NOTE — ANESTHESIA POSTPROCEDURE EVALUATION
"Patient: Ludmila Escamilla \"Lubna\"    Procedure Summary       Date: 05/01/25 Room / Location: [facility] OR 03 / Virtual [facility] OR    Anesthesia Start: 0726 Anesthesia Stop:     Procedure: Left Breast Excisional Biopsy with Magseed Localization X 2 (Left: Breast) Diagnosis:       Abnormal finding on breast imaging      (Abnormal finding on breast imaging [R92.8])    Surgeons: Korin Hickey MD Responsible Provider: Tai Arce MD    Anesthesia Type: general ASA Status: 2            Anesthesia Type: general    Vitals Value Taken Time   /64 05/01/25 08:31   Temp 36.8 °C (98.2 °F) 05/01/25 08:19   Pulse 83 05/01/25 08:33   Resp 20 05/01/25 08:30   SpO2 97 % 05/01/25 08:33   Vitals shown include unfiled device data.    Anesthesia Post Evaluation    Patient location during evaluation: bedside  Patient participation: complete - patient cannot participate  Level of consciousness: awake  Pain score: 0  Pain management: adequate  Airway patency: patent  Cardiovascular status: acceptable and hemodynamically stable  Respiratory status: acceptable and nonlabored ventilation  Hydration status: acceptable  Postoperative Nausea and Vomiting: none        No notable events documented.    "

## 2025-05-01 NOTE — INTERVAL H&P NOTE
"H&P reviewed. The patient was examined and there are no changes to the H&P. See clinic note copied below:    Cleo Saenz MD  General Surgery       BREAST SURGERY NEW PATIENT VISIT     Subjective  Ludmila Escamilla \"Lubna\" is a 69 y.o. female referred by Dr. Tracee Mclean and Regla Jeffries CNP for atypical ductal hyperplasia of the left breast  On 2025 she had a stereotactic core biopsy of 2 areas of calcifications in the left breast.  Both areas showed atypical ductal hyperplasia.  The calcifications spanned about 3.5 cm from the middle to posterior aspect of the breast.     FINAL DIAGNOSIS   A. Left breast calcifications at central medial, stereotactic-guided, vacuum-assisted core biopsy:   -- Atypical ductal hyperplasia with associated microcalcifications.  -- Atypical lobular hyperplasia, see note.        B. Left breast calcifications at upper outer, stereotactic-guided, vacuum-assisted core biopsy:   -- Atypical ductal hyperplasia in part involving fragments of an intraductal papilloma with associated calcifications.  -- Atypical lobular hyperplasia, see note.          Mammogram:   Radiology review: All images and reports were personally reviewed.         Past Medical History: hypertension, osteopenia, hyperlipidemia, limb-girdle muscular dystrophy, osteoarthritis, abnormal gait.      Non-smoker     BREAST IMAGIN2025 Left diagnostic mammogram, indicates BI-RADS Category 4. There are 2 separate groups of calcifications in the left breast as described. Further evaluation with surgical consultation and stereotactic-guided biopsy is recommended.      REPRODUCTIVE HISTORY: menarche age 14, , first birth age 27, did not breastfeed, OCP's x 10-20 years, natural menopause age 51, HRT x 12 years, scattered fibroglandular tissue     FAMILY CANCER HISTORY:   none     Review of Systems   Constitutional symptoms: Denies generalized fatigue.  Denies weight change, fevers/chills, difficulty " sleeping   Eyes: Denies double vision, glaucoma, cataracts.  Ear/nose/throat/mouth: Denies hearing changes, sore throat, sinus problems.  Cardiovascular: No chest pain.  Denies irregular heartbeat.  Denies ankle swelling.  Respiratory: No wheezing, cough, or shortness of breath.  Gastrointestinal: No abdominal pain,  No nausea/vomiting.  No indigestion/heartburn.  No change in bowel habits.  No constipation or diarrhea.   Genitourinary: No urinary incontinence.  No urinary frequency.  No painful urination.  Musculoskeletal: No bone pain, no muscle pain, no joint pain.   Integumentary: No rash. No masses.  No changes in moles.  No easy bruising.  Neurological: No headaches.  No tremors. No numbness/tingling.  Psychiatric: No anxiety. No depression.  Endocrine: No excessive thirst.  Not too hot or too cold.  Not tired or fatigued.    Hematological/lymphatic: No swollen glands or blood clotting problems.  No bruising.     PHYSICAL EXAM:     General: Alert and oriented x 3.  Mood and affect are appropriate.  HEENT: EOMI, PERRLA.   Neck: supple, no masses, no cervical adenopathy.  Cardiovascular: no lower extremity edema.  Regular rhythm.  Pulmonary: breathing non labored on room air.  Clear to auscultation.  GI: Abdomen soft, no masses. No hepatomegaly or splenomegaly.  Lymph nodes: No supraclavicular or axillary adenopathy bilaterally.  Musculoskeletal: Full range of motion in the upper extremities bilaterally.  Neuro: denies dizziness, tremors  Physical Exam     Assessment/Plan  Calcifications in the left breast  Calcifications span 3.5 cm and a biopsy was done of the anteriormost and posterior most aspects.  Both showed atypical ductal hyperplasia.     A core biopsy showed atypical ductal hyperplasia (ADH). Because there are abnormal cells, an excisional (surgical) biopsy is recommended and the entire area seen on the imaging studies should be removed. There is a 10-15% risk of finding a noninvasive or invasive  breast cancer. The risks, benefits and procedures have been reviewed in detail. We will schedule an excisional biopsy with magseed localization. We will need to place a small magnetic marking clip called a Magseed in the breast to help locate the area that will be removed at surgery. This is a minor procedure which will be done by a radiologist sometime before surgery. It is similar to having the marker placed when you had the biopsy.   I have discussed with the patient the pathophysiology of the disease process and the rationale for the planned surgery. I have explained the anticipated risks and possible complications, the incidences and consequences of those risks and complications, and the expected postoperative course. Alternatives have been discussed including the alternative of no surgery. The patient has been given the opportunity to ask questions and all her questions have been answered to her satisfaction.  Individual shared decision making was implemented.     Surgery has been recommended. The risks, benefits and procedure have been reviewed with you today.   You may be scheduled for pre-surgical testing and detailed instructions will be given to you at that appointment.  The pathology results from your surgery should be available about 14 days after the procedure. I will call you with the results. If you do not hear from me within 21 days, please call the office at [phone] for your results.     Schedule left breast excisional biopsy with Magseed localization     The patient will be on vacation April 2-8.  We will schedule surgery when she returns.        Korin Hickey MD

## 2025-05-01 NOTE — OP NOTE
"Left Breast Excisional Biopsy with Magseed Localization X 2 (L) Operative Note     Date: 2025  OR Location: [facility]    Name: Ludmila Escamilla \"Lubna\", : 1955, Age: 69 y.o., MRN: [id number], Sex: female    Diagnosis  Pre-op Diagnosis      * Abnormal finding on breast imaging [R92.8] Post-op Diagnosis     * Abnormal finding on breast imaging [R92.8]     Procedures  Left Breast Excisional Biopsy with Magseed Localization X 2  17169 - WA EXC BREAST LES PREOP PLMT RAD MARKER OPEN 1 LES      Surgeons      * Korin Hickey - Primary    Resident/Fellow/Other Assistant:  Cleo Ballard MD, PGY2    Staff:   Luisulator: Marguerite Peters Person: July    Anesthesia Staff: Anesthesiologist: Tai Arce MD  C-AA: AJ Sousa    Procedure Summary  Anesthesia: General  ASA: II  Estimated Blood Loss: 5mL  Intra-op Medications:   Administrations occurring from 0715 to 0845 on 25:   Medication Name Total Dose   sodium chloride 0.9 % irrigation solution 1,000 mL   bupivacaine PF 0.25 % (Marcaine) 0.25 % (2.5 mg/mL) 20 mL, lidocaine-epinephrine (Xylocaine W/EPI) 1 %-1:100,000 20 mL syringe 5 mL   ceFAZolin (Ancef) 1 g 2 g   dexAMETHasone (Decadron) 4 mg/mL IV Syringe 2 mL 8 mg   ePHEDrine injection 15 mg   fentaNYL (Sublimaze) injection 50 mcg/mL 50 mcg   LR infusion Cannot be calculated   lidocaine (Xylocaine) injection 2 % 60 mg   midazolam PF (Versed) injection 1 mg/mL 2 mg   ondansetron (Zofran) 2 mg/mL injection 4 mg   phenylephrine 40 mcg/mL syringe 10 mL 250 mcg   propofol (Diprivan) injection 10 mg/mL 170 mg              Anesthesia Record               Intraprocedure I/O Totals          Intake    LR infusion 700.00 mL    Total Intake 700 mL          Specimen:   ID Type Source Tests Collected by Time   1 : LEFT BREAST TISSUE MEDIAL POSTERIOR, SHORT SUPERIOR LONG LATERAL Tissue BREAST LUMPECTOMY LEFT SURGICAL PATHOLOGY EXAM Korin Hickey MD 2025 0754   2 : LEFT BREAST TISSUE LATERAL CENTRAL, SHORT " "SUPERIOR LONG LATERAL Tissue BREAST LUMPECTOMY LEFT SURGICAL PATHOLOGY EXAM Korin Hickey MD 5/1/2025 0759          Indications: Ludmila Escamilla \"Lubna\" is an 69 y.o. female who is having surgery for Abnormal finding on breast imaging [R92.8].     The patient was seen in the preoperative area. The risks, benefits, complications, treatment options, non-operative alternatives, expected recovery and outcomes were discussed with the patient. The possibilities of reaction to medication, pulmonary aspiration, injury to surrounding structures, bleeding, recurrent infection, the need for additional procedures, failure to diagnose a condition, and creating a complication requiring transfusion or operation were discussed with the patient. The patient concurred with the proposed plan, giving informed consent.  The site of surgery was properly noted/marked if necessary per policy. The patient has been actively warmed in preoperative area. Preoperative antibiotics have been ordered and given within 1 hours of incision. Venous thrombosis prophylaxis have been ordered including bilateral sequential compression devices    Procedure Details:   Operative indications: The patient had a mammogram showing 2 separate areas of calcifications in the left breast. A core biopsy of each area showed atypical ductal hyperplasia. Recommendation for excision was discussed with the patient. The risks, benefits and procedure were discussed with the patient including bleeding, infection, scar tissue formation, deformity of the breast and anesthesia. She understood all risks and agreed to proceed.    Operative report: The patient was taken to the operating room and placed on the table in the supine position. A timeout was performed. The site had been marked preoperatively. The location of the magseeds were identified using the sentimag probe. She received general anesthesia and IV antibiotics without complication and was prepped and draped in the " usual sterile fashion. The location of the magseeds was again identified using the sentimag probe. Local anesthesia was obtained and then an incision was made with a 15 blade scalpel in the upper central of the left breast. Dissection was continued with Metzenbaum scissors. The location of the more medial posterior magseed was identified and then an area of tissue surrounding the magseed was grasped with an Allis clamp. The tissue was excised using Metzenbaum scissors.  The specimen was labeled with a short stitch superiorly and a long stitch laterally. The specimen was labeled as left breast tissue medial posterior. The sentimag probe was used to confirm that the Magseed was present in the tissue specimen.  The specimen was radiographed using the Trident radiography system and a specimen radiograph confirmed that the area of concern, the hydromark clip and the Magseed were present in the tissue specimen. The specimen was then sent to pathology. The location of the more lateral magseed was identified and then an area of tissue surrounding the magseed was grasped with an Allis clamp. The tissue was excised using Metzenbaum scissors.  The specimen was labeled with a short stitch superiorly and a long stitch laterally. The specimen was labeled as left breast tissue lateral central.  This area was just deep to the superior aspect of the areola.  The sentimag probe was used to confirm that the Magseed was present in the tissue specimen.  The cavity was swept with the sentimag probe and no activity was noted. The specimen was radiographed using the Trident radiography system and a specimen radiograph confirmed that the area of concern, the hydromark clip and the Magseed were present in the tissue specimen. The specimen was then sent to pathology. Hemostasis was achieved with Bovie electrocautery. After adequate hemostasis, the wound was irrigated and the irrigation fluid was suctioned out. A superficial, subcutaneous  layer was closed with interrupted 3-0 Vicryl stitches. The skin was closed with a running subcuticular 4-0 monocryl stitch. Steri-Strips were placed followed by fluffs and a surgical bra. She tolerated the procedure well and was transferred to PACU in stable condition.   Evidence of Infection: No   Complications:  None; patient tolerated the procedure well.    Disposition: PACU - hemodynamically stable.  Condition: stable       Task Performed by CRISTIAN First Assist or Physician Assistant:   N/A      Additional Details: I personally reviewed the specimen radiographs intra-operatively.      Attending Attestation: I performed the procedure.    Korin Hickey  Phone Number: [phone]

## 2025-05-01 NOTE — ADDENDUM NOTE
Addendum  created 05/01/25 0857 by AJ Sousa    Clinical Note Signed, Intraprocedure Blocks edited, SmartForm saved

## 2025-05-01 NOTE — ANESTHESIA PROCEDURE NOTES
Airway  Date/Time: 5/1/2025 7:31 AM  Reason: elective      Staffing  Performed: JA MAN and attending   Authorized by: Tai Arce MD    Performed by: AJ Sousa    Patient Condition  Indications for airway management: anesthesia  Patient position: sniffing  Planned trial extubation  Sedation level: deep     Final Airway Details   Preoxygenated: yes  Final airway type: supraglottic airway  Successful airway: Supraglottic airway: i-gel.  Size: 3  Number of other approaches attempted: 0    Additional Comments  Performed by DONOVAN2 Maria Cervantes

## 2025-05-02 ENCOUNTER — TELEPHONE (OUTPATIENT)
Dept: SURGICAL ONCOLOGY | Facility: CLINIC | Age: 70
End: 2025-05-02
Payer: MEDICARE

## 2025-05-16 NOTE — PROGRESS NOTES
"Anatoliy Escamilla \"Lubna\" is a 69 y.o. female here for a postoperative visit.  She had a left breast Magseed localized excisional biopsy x 2 on May 1, 2025.  She is doing well and has no complaints or concerns    Pathology is pending.    Objective     Physical Exam  Chest:          Comments: The incision is healing well with no evidence of infection, seroma or hematoma.       Alert and oriented.      Assessment/Plan   2/5/2025: Left breast calcifications central medial-atypical ductal hyperplasia  2/5/2025: Left breast calcifications upper outer quadrant-atypical ductal hyperplasia    5/1/2025: Left breast excisional biopsy medial posterior-pathology pending  5/1/2025: Left breast excisional biopsy lateral central-pathology pending    I will call the patient when the pathology results are back we will review them.    She is doing well following surgery.  She may resume all activities without restrictions.    The pathology from the core biopsy showed atypical ductal hyperplasia (ADH). We discussed that this finding reveals that you may have an increased risk of developing breast cancer in the future.   We discussed the role of tamoxifen (an estrogen blocking pill) which decreases the risk of developing breast cancer. The risks of tamoxifen were reviewed including but not limited to the risk to the risk of uterine cancer, DVT (blood clots), stroke, hot flashes, night sweats, flushing and sleep disturbances.   We also discussed that some patients choose to not take a medication and have close follow-up and monitoring. Sometimes, we also will add MRIs periodically in addition to your yearly mammograms.  You can be seen in our high risk breast clinic to be followed closely by one of our breast health nurse practitioners.      If the pathology shows atypical ductal hyperplasia, she will follow-up with one of our nurse practitioners in the high risk breast clinic.  Korin Hickey MD   "

## 2025-05-23 ENCOUNTER — TELEPHONE (OUTPATIENT)
Dept: SURGICAL ONCOLOGY | Facility: CLINIC | Age: 70
End: 2025-05-23

## 2025-05-23 ENCOUNTER — OFFICE VISIT (OUTPATIENT)
Dept: SURGICAL ONCOLOGY | Facility: CLINIC | Age: 70
End: 2025-05-23
Payer: MEDICARE

## 2025-05-23 VITALS
TEMPERATURE: 98.2 F | OXYGEN SATURATION: 98 % | HEART RATE: 80 BPM | BODY MASS INDEX: 20.84 KG/M2 | SYSTOLIC BLOOD PRESSURE: 112 MMHG | WEIGHT: 106.7 LBS | DIASTOLIC BLOOD PRESSURE: 75 MMHG

## 2025-05-23 DIAGNOSIS — R92.8 ABNORMAL FINDING ON BREAST IMAGING: Primary | ICD-10-CM

## 2025-05-23 LAB
LAB AP ASR DISCLAIMER: NORMAL
LABORATORY COMMENT REPORT: NORMAL
PATH REPORT.COMMENTS IMP SPEC: NORMAL
PATH REPORT.FINAL DX SPEC: NORMAL
PATH REPORT.GROSS SPEC: NORMAL
PATH REPORT.RELEVANT HX SPEC: NORMAL
PATH REPORT.TOTAL CANCER: NORMAL

## 2025-05-23 PROCEDURE — 3074F SYST BP LT 130 MM HG: CPT | Performed by: SURGERY

## 2025-05-23 PROCEDURE — 1159F MED LIST DOCD IN RCRD: CPT | Performed by: SURGERY

## 2025-05-23 PROCEDURE — 3078F DIAST BP <80 MM HG: CPT | Performed by: SURGERY

## 2025-05-23 PROCEDURE — 1036F TOBACCO NON-USER: CPT | Performed by: SURGERY

## 2025-05-23 PROCEDURE — 99211 OFF/OP EST MAY X REQ PHY/QHP: CPT | Performed by: SURGERY

## 2025-05-23 PROCEDURE — 1126F AMNT PAIN NOTED NONE PRSNT: CPT | Performed by: SURGERY

## 2025-05-23 PROCEDURE — 1160F RVW MEDS BY RX/DR IN RCRD: CPT | Performed by: SURGERY

## 2025-05-23 ASSESSMENT — PAIN SCALES - GENERAL: PAINLEVEL_OUTOF10: 0-NO PAIN

## 2025-05-23 NOTE — PATIENT INSTRUCTIONS
Dr. Hickey will call when the path results come back.  Results are back, Dr. Hickey called patient and pt is to follow up with the [facility]

## 2025-05-23 NOTE — TELEPHONE ENCOUNTER
Result Communication    Resulted Orders   Surgical Pathology Exam   Result Value Ref Range    Case Report       Surgical Pathology                                Case: [id number]                                  Authorizing Provider:  Korin Hickey MD            Collected:           05/01/2025 0754              Ordering Location:     [facility] OR Received:            05/01/2025 0822              Pathologist:           Zenaida Valerio MD                                                            Specimens:   A) - BREAST LUMPECTOMY LEFT, LEFT BREAST TISSUE MEDIAL POSTERIOR, SHORT SUPERIOR LONG               LATERAL                                                                                             B) - BREAST LUMPECTOMY LEFT, LEFT BREAST TISSUE LATERAL CENTRAL, SHORT SUPERIOR LONG                LATERAL                                                                                    FINAL DIAGNOSIS       A. Left breast, medial posterior, magseed localized partial mastectomy:  -- Atypical ductal hyperplasia.   -- Atypical lobular hyperplasia.   -- Usual ductal hyperplasia.   -- Intraductal papilloma.   -- Apocrine metaplasia.   -- Previous biopsy site changes.   -- Microcalcifications associated with atypical ductal hyperplasia and intraductal papilloma.     Note:  Immunohistochemical stains for CK5/6 and ER support the diagnosis of atypical ductal hyperplasia.  E-cadherin is reduced/absent  in atypical lobular hyperplasia.  Multiple deeper levels were examined.     B. Left breast, lateral central, magseed localized partial mastectomy:  -- Atypical ductal hyperplasia, in part involving an intraductal papilloma.   -- Atypical lobular hyperplasia.  -- Apocrine metaplasia.   -- Usual ductal hyperplasia.   -- Previous biopsy site changes.   -- Microcalcifications associated with atypical ductal hyperplasia, intraductal papilloma, and stroma.     Note:  Immunohistochemical stains for CK5/6 and ER  "support the diagnosis of atypical ductal hyperplasia.  E-cadherin is reduced/absent  in atypical lobular hyperplasia.  Multiple deeper levels were examined.                 By the signature on this report, the individual or group listed as making the Final Interpretation/Diagnosis certifies that they have reviewed this case.       Comment       Staff Consultants: Dr. Holly Ball and Dr. Tommy Ulrich.       Clinical History       Pre-op diagnosis:  Abnormal finding on breast imaging [R92.8]      Gross Description       A:  Received in formalin, labeled with the patient's name and hospital number, and \"LEFT BREAST TISSUE MEDIAL\", is an irregular segment of yellow lobulated fibrofatty tissue, 4.9 (superior to inferior) x 3.7 (anterior to posterior) x 1.7 (medial to lateral) cm.  A skin ellipse is not present.  The specimen is oriented with a short superior and a long lateral stitch.  A localizing needle is not identified.  The specimen is inked in the following manner:  anterior green, posterior black, superior red, inferior blue, medial yellow, and lateral orange.  The specimen is serially sectioned from superior to inferior into 12 slices.      The cut surface of slices 2-4 reveals an ill-defined, tan-white, firm lesion, 0.7 x 0.4 x 0.4 cm.  The lesion abuts the superior margin, is 0.5 cm from the medial margin, 0.8 cm from the anterior and lateral margins, 1.0 cm from the posterior margin, and 3.7 cm from the inferior margin.  A biopsy cavity is not identified; however, an hourglass clip is identified within the adjacent adipose tissue of slice 3, located 0.4 cm from the lateral margin.  A Magseed is also identified within the adipose tissue of slice 4, located 0.5 cm from the anterior margin.  The remainder of the breast parenchyma is unremarkable.  No other lesions are grossly identified.  A photograph has been taken.  The specimen is entirely submitted in 13 cassettes.  MAD    NOTE:  Ischemia time: 5/1/25 at " "0754.  This specimen was placed into formalin at: 5/1/25 at 0756.     Summary of Cassettes:  Specimen Label Site  A  1 Slice 1, most superior end, perpendicular    2 Slice 2 with lesion and closest superior margin    3 Slice 3 with lesion, hourglass clip, and closest anterior, posterior, lateral and medial margins    4 Slice 4 with lesion and Magseed    5 Slice 5    6 Slice 6    7 Slice 7    8 Slice 8    9 Slice 9    10 Slice 10    11-12 Slice 11    13 Slice 12, most inferior end, perpendicular    B:  Received in formalin, labeled with the patient's name and hospital number, and \"LEFT BREAST TISSUE LATERAL\", is an irregular segment of yellow lobulated fibrofatty tissue, 3.5 (medial to lateral) x 2.9 (anterior to posterior) x 2.2 (superior to inferior) cm.  A skin ellipse is not present.  The specimen is oriented with a short superior and a long lateral stitch.  A localizing needle is not identified.  The specimen is inked in the following manner:  anterior green, posterior black, superior red, inferior blue, medial yellow, and lateral orange.  The specimen is serially sectioned from medial to lateral into 10 slices.      The cut surface of slices 3-4 reveals an ill-defined, yellow-white, spiculated lesion, 0.8 x 0.8 x 0.7 cm.  The lesion is 0.4 cm from the superior margin, 0.5 cm from the anterior and inferior margins, 0.6 cm from the posterior margin, 0.8 cm from the medial margin, and 1.4 cm from the lateral margin.  A biopsy cavity is not identified; however a cork clip and Magseed are identified within the lesion of slice 3, located 0.7 cm and 0.6 cm from the posterior and anterior margins, respectively.  The remainder of the breast parenchyma is unremarkable.  No other lesions are grossly identified.  A photograph has been taken.  The specimen is entirely submitted in 10 cassettes.  MAD    NOTE:  Ischemia time: 5/1/25 at 0759.  This specimen was placed into formalin at: 5/1/25 at 0801.     Summary of " Cassettes:  Specimen Label Site  B  1 slice 1, most medial end, perpendicular    2 slice 2, medially adjacent to lesion    3 slice 3 with lesion, and cork clip and Magseed    4 slice 4 with lesion, and closest anterior, posterior, superior and inferior margins    5 slice 5, laterally adjacent to lesion    6 slice 6    7 slice 7    8 slice 8    9 slice 9    10 slice 10, most lateral end, perpendicular        Disclaimer       One or more of the reagents used to perform assays on this specimen MAY have contained components considered to be analyte specific reagents (ASR's).  ASR's have not been cleared or approved by the U.S. Food and Drug Administration.  These assays were developed and their performance characteristics determined by the Department of Pathology at [facility]. The FDA does not require this test to go through premarket FDA review. This test is used for clinical purposes. It should not be regarded as investigational or for research. This laboratory is certified under the Clinical Laboratory Improvement Amendments (CLIA) as qualified to perform high complexity clinical laboratory testing.  The assays were performed with appropriate positive and negative controls which stained appropriately.         12:48 PM      Results were successfully communicated with the patient and they acknowledged their understanding.  Patient will follow-up in high risk breast clinic.

## 2025-07-15 NOTE — PROGRESS NOTES
"[facility]  Ludmila Escamilla female   1955 69 y.o.  [id number]      Chief Complaint  Follow up high risk evaluation.    History Of Present Illness  Ludmila Escamilla \"Lubna\" is a very pleasant 69 y.o. female followed in the breast center for high risk surveillance care. She is s/p left breast mag seed excisional biopsy x 2 on 2025 with Dr. Korin Hickey for atypical ductal hyperplasia (ADH). She has no family history of breast cancer.     REPRODUCTIVE HISTORY: menarche age 14, , first birth age 27, did not breastfeed, OCP's x 10-20 years, natural menopause age 56, HRT x 12 years, scattered fibroglandular tissue    FAMILY CANCER HISTORY:   none    Review of Systems  Review of Systems   Constitutional: Negative.    HENT:  Negative.     Eyes: Negative.    Respiratory: Negative.     Cardiovascular: Negative.    Gastrointestinal: Negative.    Endocrine: Negative.    Genitourinary: Negative.     Musculoskeletal:  Positive for arthralgias.   Skin: Negative.    Neurological: Negative.    Hematological: Negative.    Psychiatric/Behavioral: Negative.         Past Medical History  She has a past medical history of Hernia, internal, HTN (hypertension), Limb girdle muscular dystrophy, unspecified, Muscular dystrophy (Multi), and PONV (postoperative nausea and vomiting).    She has no past medical history of Personal history of irradiation.    Surgical History  She has a past surgical history that includes Dilation and curettage of uterus (2023); Total hip arthroplasty (Bilateral, ); Hip resurfacing (Bilateral); Colonoscopy; LASIK; Breast biopsy (Left, 2025); and Hernia repair (2024).    Family History  Cancer-related family history is not on file.     Social History  She reports that she has never smoked. She has never been exposed to tobacco smoke. She has never used smokeless tobacco. She reports current alcohol use of about 2.0 standard drinks of alcohol per week. She reports " that she does not use drugs.    Allergies  Succinylcholine and Pollen extracts    Medications  Current Outpatient Medications   Medication Instructions    amLODIPine-benazepriL (Lotrel) 5-10 mg capsule 1 capsule, Daily RT    ascorbic acid, vitamin C, 500 mg capsule Take by mouth.    azelastine (Astelin) 137 mcg (0.1 %) nasal spray 2 sprays, 2 times daily    cyanocobalamin (VITAMIN B-12) 250 mcg, Daily    DIETARY SUPPLEMENT ORAL 1 Application, Once    fluticasone (Flonase) 50 mcg/actuation nasal spray 1 spray, Daily RT       Last Recorded Vitals  Vitals:    07/16/25 0921   BP: 111/74   Pulse: 76   Temp: 36.1 °C (97 °F)   SpO2: 99%         Physical Exam  Chest:        Patient is alert and oriented x3 and in a relaxed and appropriate mood. Her gait is steady and hand grasps are equal. Sclera is clear. The breasts are nearly symmetrical. Left breast has a well healed excisional biopsy incision. The tissue is soft without palpable abnormalities, discrete nodules or masses. The skin and nipples appear normal. There is no cervical, supraclavicular or axillary lymphadenopathy.        Relevant Results and Imaging  none    Risk Profile        Visit Diagnosis  1. Breast cancer screening, high risk patient  Clinic Appointment Request Follow Up    BI mammo bilateral screening tomosynthesis      2. Breast cancer screening by mammogram  BI mammo bilateral screening tomosynthesis      3. Atypical ductal hyperplasia of left breast            Assessment  High risk surveillance, normal clinical exam, hx left excisional biopsy for ADH, no family history breast cancer scattered fibroglandular tissue      Plan  Return January 2026 for bilateral screening mammogram and exam Will start full breast MRI July 2026. Declines endocrine therapy.    Patient Discussion/Summary  High risk breast surveillance care plan:  Yearly mammogram with digital breast tomosynthesis  Twice yearly clinical breast examinations  Breast MRI (to schedule call  [phone])  Monthly self breast examinations &/or regular self breast awareness  Vitamin D3 2000 IU/daily (over the counter) unless your PCP recommends you take a specific dose  Exercise 3-4 times per week for 45-60 minutes  Limit alcohol to 3-4 drinks per week if you are menopausal  Eat healthy low-fat diet with lots of vegetable & fruits  Risk model indicates you are eligible for endocrine therapy with Tamoxifen, Raloxifene or Aromatase inhibitor. Endocrine therapy reduces lifetime risk of breast cancer by up to 50% when taken for 5 years. There is an alternative low dose Tamoxifen which can be taken for only 3 years.    IMPORTANT INFORMATION REGARDING YOUR RESULTS    If you receive medical information from My [facility] Personal Health Record (online chart) your results will be released into your chart. This means you may view or see results of your biopsy or procedure before I contact you directly. If this occurs, please call the office and we will discuss your results over the phone.    You can see your health information, review clinical summaries from office visits & test results online when you follow your health with MY  Chart, a personal health record. To sign up go to [email or web address]. If you need assistance with signing up or trouble getting into your account call [facility] Patient Line 24/7 at [phone].    My office phone number is [phone]  if you need to get in touch with me or have additional questions or concerns. Thank you for choosing [facility] and trusting me as your healthcare provider. I look forward to seeing you again at your next office visit. I am honored to be a provider on your health care team and I remain dedicated to helping you achieve your health goals.       Regla Jeffries, KELSEA-CNP

## 2025-07-16 ENCOUNTER — OFFICE VISIT (OUTPATIENT)
Dept: SURGICAL ONCOLOGY | Facility: CLINIC | Age: 70
End: 2025-07-16
Payer: MEDICARE

## 2025-07-16 VITALS
HEART RATE: 76 BPM | DIASTOLIC BLOOD PRESSURE: 74 MMHG | OXYGEN SATURATION: 99 % | TEMPERATURE: 97 F | WEIGHT: 104.83 LBS | BODY MASS INDEX: 20.47 KG/M2 | SYSTOLIC BLOOD PRESSURE: 111 MMHG

## 2025-07-16 DIAGNOSIS — Z12.39 BREAST CANCER SCREENING, HIGH RISK PATIENT: Primary | ICD-10-CM

## 2025-07-16 DIAGNOSIS — Z12.31 BREAST CANCER SCREENING BY MAMMOGRAM: ICD-10-CM

## 2025-07-16 DIAGNOSIS — N60.92 ATYPICAL DUCTAL HYPERPLASIA OF LEFT BREAST: ICD-10-CM

## 2025-07-16 PROCEDURE — 1159F MED LIST DOCD IN RCRD: CPT | Performed by: NURSE PRACTITIONER

## 2025-07-16 PROCEDURE — 1036F TOBACCO NON-USER: CPT | Performed by: NURSE PRACTITIONER

## 2025-07-16 PROCEDURE — 99214 OFFICE O/P EST MOD 30 MIN: CPT | Performed by: NURSE PRACTITIONER

## 2025-07-16 PROCEDURE — 1126F AMNT PAIN NOTED NONE PRSNT: CPT | Performed by: NURSE PRACTITIONER

## 2025-07-16 PROCEDURE — 3078F DIAST BP <80 MM HG: CPT | Performed by: NURSE PRACTITIONER

## 2025-07-16 PROCEDURE — 3074F SYST BP LT 130 MM HG: CPT | Performed by: NURSE PRACTITIONER

## 2025-07-16 PROCEDURE — 99024 POSTOP FOLLOW-UP VISIT: CPT | Performed by: NURSE PRACTITIONER

## 2025-07-16 ASSESSMENT — ENCOUNTER SYMPTOMS
ARTHRALGIAS: 1
HEMATOLOGIC/LYMPHATIC NEGATIVE: 1
EYES NEGATIVE: 1
NEUROLOGICAL NEGATIVE: 1
PSYCHIATRIC NEGATIVE: 1
GASTROINTESTINAL NEGATIVE: 1
CARDIOVASCULAR NEGATIVE: 1
RESPIRATORY NEGATIVE: 1
ENDOCRINE NEGATIVE: 1
CONSTITUTIONAL NEGATIVE: 1

## 2025-07-16 ASSESSMENT — PAIN SCALES - GENERAL: PAINLEVEL_OUTOF10: 0-NO PAIN

## 2025-07-16 ASSESSMENT — PATIENT HEALTH QUESTIONNAIRE - PHQ9
2. FEELING DOWN, DEPRESSED OR HOPELESS: NOT AT ALL
SUM OF ALL RESPONSES TO PHQ9 QUESTIONS 1 & 2: 0
1. LITTLE INTEREST OR PLEASURE IN DOING THINGS: NOT AT ALL

## (undated) DEVICE — GOWN, SURGICAL, SMARTGOWN, LARGE, STERILE

## (undated) DEVICE — SUTURE, MONOCRYL, 4-0, 18 IN, PS2, UNDYED

## (undated) DEVICE — SUTURE, PROLENE, 0, 30 IN, CT, BLUE

## (undated) DEVICE — STRIP, SKIN CLOSURE, STERI STRIP, REINFORCED, 0.5 X 4 IN

## (undated) DEVICE — PUMP, STRYKERFLOW 2 & HANDPIECE W/10FT. IRRIGATION TUBING

## (undated) DEVICE — SUTURE, VICRYL, 0, 18 IN, TIE, VIOLET

## (undated) DEVICE — SHEARS, CURVED 5MM, ENDOPATH

## (undated) DEVICE — ADHESIVE, SKIN, LIQUIBAND EXCEED

## (undated) DEVICE — TUBE SET, PNEUMOCLEAR, SMOKE EVACU, HIGH-FLOW

## (undated) DEVICE — Device

## (undated) DEVICE — SUTURE, PROLENE, 0, 30 IN, SH

## (undated) DEVICE — FIXATION SYSTEM, OPTIFIX, 15 FASTENERS

## (undated) DEVICE — SOLUTION, IRRIGATION, SODIUM CHLORIDE 0.9%, 1000 ML, POUR BOTTLE

## (undated) DEVICE — ELECTRODE, ELECTROSURGICAL, BLADE, INSULATED, ENT/IMA, STERILE

## (undated) DEVICE — SUTURE, PDS II, 0, 36 IN, CT, VIOLET

## (undated) DEVICE — DRAPE, SHEET, UTILITY, NON ABSORBENT, 18 X 26 IN, LF

## (undated) DEVICE — TROCAR SYSTEM, BALLOON, KII GELPORT, 12 X 100MM

## (undated) DEVICE — TOWEL, SURGICAL, NEURO, O/R, 16 X 26, BLUE, STERILE

## (undated) DEVICE — SYSTEM, TROCAR LAP, 5X100MM, SHIELD BLADED, KII ADVANCED FIX ABDOMINAL

## (undated) DEVICE — COUNTER, NEEDLE, FOAM BLOCK, POP-N-COUNT, W/BLADEGUARD, W/ADHESIVE 40 COUNT, RED

## (undated) DEVICE — CUTTER,  LINEAR RELOAD, THICK TISSUE, 75MM, GREEN

## (undated) DEVICE — CANNULA, KII ADVANCED FIXATION, 5X100MM W/SEAL

## (undated) DEVICE — NERVE BLOCK, STIMUQUIK, SINGLE-SHOT, 21GA X 3-1/2

## (undated) DEVICE — DEVICE, GELPOINT, SINGLE PORT

## (undated) DEVICE — CLIP, ENDO APPLIER LIGAMAX 5MM

## (undated) DEVICE — TUBING, SUCTION, CONNECTING, STERILE 0.25 X 120 IN., LF

## (undated) DEVICE — PROBE COVER, INTRAOPERATIVE, 13 X 244CM (5 X 96IN)

## (undated) DEVICE — GLOVE, SURGICAL, PROTEXIS PI BLUE W/NEUTHERA, 6.5, PF, LF

## (undated) DEVICE — SUTURE, SILK, 3-0, 30 IN, BR SH, BLACK

## (undated) DEVICE — DRESSING, GAUZE, FLUFF, 1 PLY, 18 X 36 IN

## (undated) DEVICE — SUTURE, V-LOC 3-0 V-20 6 GR 180 ABS"

## (undated) DEVICE — SUTURE, VICRYL, 0, 27 IN, UR-6, VIOLET

## (undated) DEVICE — TRAY, SURESTEP, URINE METER, 16FR, COMPLETE, W/STATLOCK

## (undated) DEVICE — SCOPE WARMER, LAPAROSCOPE, BAG ONLY, LF

## (undated) DEVICE — PAD, GROUNDING, ELECTROSURGICAL, W/9 FT CABLE, POLYHESIVE II, ADULT, LF

## (undated) DEVICE — SLEEVE, SCD EXPRESS, KNEE LENGTH-MEDIUM

## (undated) DEVICE — SUTURE, VICRYL, 2-0, 18 IN, UNDYED

## (undated) DEVICE — CUTTER, PROX LINEAR, 75MM, REG TISSUE, W/ SAFETY LOCK OUT

## (undated) DEVICE — SUTURE, VICRYL PLUS 3-0, SH, 27IN

## (undated) DEVICE — HANDPIECE, POOLE SUCTION, W/O TUBING

## (undated) DEVICE — SUTURE, VICRYL, 3-0, 27 IN, SH

## (undated) DEVICE — SYRINGE, 10 CC, LUER LOCK

## (undated) DEVICE — DRAPE, LEGGINGS, 28.5 X 43 IN, DISPOSABLE, LF, STERILE

## (undated) DEVICE — GOWN, SURGICAL, SMARTGOWN, XLARGE, STERILE

## (undated) DEVICE — GLOVE, SURGICAL, PROTEXIS PI ORTHO, 7.0, PF, LF

## (undated) DEVICE — DRAPE, SHEET, THREE QUARTER, FAN FOLD, 57 X 77 IN